# Patient Record
Sex: MALE | Race: WHITE | NOT HISPANIC OR LATINO | URBAN - METROPOLITAN AREA
[De-identification: names, ages, dates, MRNs, and addresses within clinical notes are randomized per-mention and may not be internally consistent; named-entity substitution may affect disease eponyms.]

---

## 2021-01-28 ENCOUNTER — IMMUNIZATIONS (OUTPATIENT)
Dept: FAMILY MEDICINE CLINIC | Facility: HOSPITAL | Age: 81
End: 2021-01-28

## 2021-01-28 DIAGNOSIS — Z23 ENCOUNTER FOR IMMUNIZATION: Primary | ICD-10-CM

## 2021-01-28 PROCEDURE — 0001A SARS-COV-2 / COVID-19 MRNA VACCINE (PFIZER-BIONTECH) 30 MCG: CPT

## 2021-01-28 PROCEDURE — 91300 SARS-COV-2 / COVID-19 MRNA VACCINE (PFIZER-BIONTECH) 30 MCG: CPT

## 2021-02-22 ENCOUNTER — IMMUNIZATIONS (OUTPATIENT)
Dept: FAMILY MEDICINE CLINIC | Facility: HOSPITAL | Age: 81
End: 2021-02-22

## 2021-02-22 DIAGNOSIS — Z23 ENCOUNTER FOR IMMUNIZATION: Primary | ICD-10-CM

## 2021-02-22 PROCEDURE — 0002A SARS-COV-2 / COVID-19 MRNA VACCINE (PFIZER-BIONTECH) 30 MCG: CPT

## 2021-02-22 PROCEDURE — 91300 SARS-COV-2 / COVID-19 MRNA VACCINE (PFIZER-BIONTECH) 30 MCG: CPT

## 2021-10-02 ENCOUNTER — IMMUNIZATIONS (OUTPATIENT)
Dept: FAMILY MEDICINE CLINIC | Facility: HOSPITAL | Age: 81
End: 2021-10-02

## 2021-10-02 DIAGNOSIS — Z23 ENCOUNTER FOR IMMUNIZATION: Primary | ICD-10-CM

## 2021-10-02 PROCEDURE — 91300 SARS-COV-2 / COVID-19 MRNA VACCINE (PFIZER-BIONTECH) 30 MCG: CPT

## 2021-10-02 PROCEDURE — 0001A SARS-COV-2 / COVID-19 MRNA VACCINE (PFIZER-BIONTECH) 30 MCG: CPT

## 2024-03-08 ENCOUNTER — HOSPITAL ENCOUNTER (INPATIENT)
Dept: HOSPITAL 99 - 2 SOUTH | Age: 84
LOS: 7 days | Discharge: NURSING, REHAB OR LONG-TERM CARE | DRG: 522 | End: 2024-03-15
Payer: COMMERCIAL

## 2024-03-08 VITALS — SYSTOLIC BLOOD PRESSURE: 121 MMHG | RESPIRATION RATE: 16 BRPM | DIASTOLIC BLOOD PRESSURE: 71 MMHG

## 2024-03-08 VITALS — RESPIRATION RATE: 18 BRPM | DIASTOLIC BLOOD PRESSURE: 78 MMHG | SYSTOLIC BLOOD PRESSURE: 134 MMHG

## 2024-03-08 VITALS — BODY MASS INDEX: 22.8 KG/M2

## 2024-03-08 VITALS — SYSTOLIC BLOOD PRESSURE: 147 MMHG | DIASTOLIC BLOOD PRESSURE: 83 MMHG | RESPIRATION RATE: 17 BRPM

## 2024-03-08 VITALS — SYSTOLIC BLOOD PRESSURE: 138 MMHG | DIASTOLIC BLOOD PRESSURE: 82 MMHG | RESPIRATION RATE: 16 BRPM

## 2024-03-08 DIAGNOSIS — S62.521A: ICD-10-CM

## 2024-03-08 DIAGNOSIS — Z11.52: ICD-10-CM

## 2024-03-08 DIAGNOSIS — K21.9: ICD-10-CM

## 2024-03-08 DIAGNOSIS — D62: ICD-10-CM

## 2024-03-08 DIAGNOSIS — E78.00: ICD-10-CM

## 2024-03-08 DIAGNOSIS — D64.9: ICD-10-CM

## 2024-03-08 DIAGNOSIS — Z86.73: ICD-10-CM

## 2024-03-08 DIAGNOSIS — Z87.891: ICD-10-CM

## 2024-03-08 DIAGNOSIS — S72.011A: Primary | ICD-10-CM

## 2024-03-08 DIAGNOSIS — E87.1: ICD-10-CM

## 2024-03-08 DIAGNOSIS — E03.9: ICD-10-CM

## 2024-03-08 DIAGNOSIS — N40.1: ICD-10-CM

## 2024-03-08 DIAGNOSIS — Z79.82: ICD-10-CM

## 2024-03-08 LAB
APTT PPP: 28.2 SEC (ref 23.4–35)
BUN SERPL-MCNC: 12 MG/DL (ref 9–20)
CALCIUM SERPL-MCNC: 9.6 MG/DL (ref 8.4–10.2)
CHLORIDE SERPL-SCNC: 106 MMOL/L (ref 98–107)
CO2 SERPL-SCNC: 26 MMOL/L (ref 22–30)
EGFR: > 60
ERYTHROCYTE [DISTWIDTH] IN BLOOD BY AUTOMATED COUNT: 13.5 % (ref 11.5–14.5)
ESTIMATED CREATININE CLEARANCE: 75 ML/MIN
GLUCOSE SERPL-MCNC: 122 MG/DL (ref 70–99)
HCT VFR BLD AUTO: 37.6 % (ref 39–52)
HGB BLD-MCNC: 13.2 G/DL (ref 13–18)
INR PPP: 1.05
MCHC RBC AUTO-ENTMCNC: 35.1 G/DL (ref 33–37)
MCV RBC AUTO: 87.9 FL (ref 80–94)
NRBC BLD AUTO-RTO: 0 %
PLATELET # BLD AUTO: 159 10^3/UL (ref 130–400)
POTASSIUM SERPL-SCNC: 3.7 MMOL/L (ref 3.5–5.1)
PROTHROMBIN TIME: 13.6 SEC (ref 11.4–14.6)
SODIUM SERPL-SCNC: 135 MMOL/L (ref 135–145)

## 2024-03-08 PROCEDURE — C1776 JOINT DEVICE (IMPLANTABLE): HCPCS

## 2024-03-08 PROCEDURE — C1713 ANCHOR/SCREW BN/BN,TIS/BN: HCPCS

## 2024-03-08 RX ADMIN — ATORVASTATIN CALCIUM 40 MG: 40 TABLET, FILM COATED ORAL at 18:33

## 2024-03-08 RX ADMIN — FINASTERIDE 5 MG: 5 TABLET, FILM COATED ORAL at 18:32

## 2024-03-08 RX ADMIN — SODIUM CHLORIDE 1000: 900 INJECTION, SOLUTION INTRAVENOUS at 18:29

## 2024-03-08 RX ADMIN — DOCUSATE SODIUM: 100 CAPSULE, LIQUID FILLED ORAL at 21:32

## 2024-03-08 RX ADMIN — Medication 1 CAP: at 21:32

## 2024-03-08 RX ADMIN — CALCIUM CARBONATE 2 TABLET: 500 TABLET, CHEWABLE ORAL at 22:00

## 2024-03-08 RX ADMIN — ENOXAPARIN SODIUM 40 MG: 40 INJECTION SUBCUTANEOUS at 18:31

## 2024-03-08 RX ADMIN — MORPHINE SULFATE 3 MG: 4 INJECTION INTRAVENOUS at 18:41

## 2024-03-08 RX ADMIN — DOCUSATE SODIUM: 100 CAPSULE, LIQUID FILLED ORAL at 22:03

## 2024-03-08 RX ADMIN — MORPHINE SULFATE 4 MG: 4 INJECTION INTRAVENOUS at 14:00

## 2024-03-09 VITALS — SYSTOLIC BLOOD PRESSURE: 132 MMHG | RESPIRATION RATE: 18 BRPM | DIASTOLIC BLOOD PRESSURE: 80 MMHG

## 2024-03-09 VITALS — RESPIRATION RATE: 16 BRPM | DIASTOLIC BLOOD PRESSURE: 87 MMHG | SYSTOLIC BLOOD PRESSURE: 138 MMHG

## 2024-03-09 VITALS — DIASTOLIC BLOOD PRESSURE: 86 MMHG | SYSTOLIC BLOOD PRESSURE: 140 MMHG | RESPIRATION RATE: 16 BRPM

## 2024-03-09 LAB
BUN SERPL-MCNC: 14 MG/DL (ref 9–20)
CALCIUM SERPL-MCNC: 9.2 MG/DL (ref 8.4–10.2)
CHLORIDE SERPL-SCNC: 104 MMOL/L (ref 98–107)
CO2 SERPL-SCNC: 24 MMOL/L (ref 22–30)
EGFR: > 60
ERYTHROCYTE [DISTWIDTH] IN BLOOD BY AUTOMATED COUNT: 13.3 % (ref 11.5–14.5)
ESTIMATED CREATININE CLEARANCE: 85 ML/MIN
GLUCOSE SERPL-MCNC: 97 MG/DL (ref 70–99)
HCT VFR BLD AUTO: 35.6 % (ref 39–52)
HGB BLD-MCNC: 12.7 G/DL (ref 13–18)
MCHC RBC AUTO-ENTMCNC: 35.7 G/DL (ref 33–37)
MCV RBC AUTO: 87.3 FL (ref 80–94)
PLATELET # BLD AUTO: 155 10^3/UL (ref 130–400)
POTASSIUM SERPL-SCNC: 3.8 MMOL/L (ref 3.5–5.1)
SODIUM SERPL-SCNC: 134 MMOL/L (ref 135–145)
TSH SERPL DL<=0.05 MIU/L-ACNC: 4.03 UIU/ML (ref 0.47–4.68)

## 2024-03-09 RX ADMIN — ACETAMINOPHEN 650 MG: 325 TABLET ORAL at 00:24

## 2024-03-09 RX ADMIN — Medication 25 MCG: at 08:37

## 2024-03-09 RX ADMIN — PANTOPRAZOLE SODIUM 40 MG: 40 TABLET, DELAYED RELEASE ORAL at 08:37

## 2024-03-09 RX ADMIN — ENOXAPARIN SODIUM 40 MG: 40 INJECTION SUBCUTANEOUS at 17:02

## 2024-03-09 RX ADMIN — MORPHINE SULFATE 3 MG: 4 INJECTION INTRAVENOUS at 12:16

## 2024-03-09 RX ADMIN — ASPIRIN 81 MG: 81 TABLET, CHEWABLE ORAL at 08:37

## 2024-03-09 RX ADMIN — ACETAMINOPHEN 650 MG: 325 TABLET ORAL at 20:36

## 2024-03-09 RX ADMIN — Medication 1 CAP: at 20:37

## 2024-03-09 RX ADMIN — SENNOSIDES 8.6 MG: 8.6 TABLET ORAL at 12:17

## 2024-03-09 RX ADMIN — LIDOCAINE 1 PATCH: 4 PATCH TOPICAL at 08:37

## 2024-03-09 RX ADMIN — Medication 1 CAP: at 08:37

## 2024-03-09 RX ADMIN — MORPHINE SULFATE 3 MG: 4 INJECTION INTRAVENOUS at 03:40

## 2024-03-09 RX ADMIN — DOCUSATE SODIUM 100 MG: 100 CAPSULE, LIQUID FILLED ORAL at 20:37

## 2024-03-09 RX ADMIN — ACETAMINOPHEN 650 MG: 325 TABLET ORAL at 07:14

## 2024-03-09 RX ADMIN — ACETAMINOPHEN 650 MG: 325 TABLET ORAL at 15:35

## 2024-03-09 RX ADMIN — DOCUSATE SODIUM 100 MG: 100 CAPSULE, LIQUID FILLED ORAL at 08:37

## 2024-03-09 RX ADMIN — LEVOTHYROXINE SODIUM 88 MCG: 0.09 TABLET ORAL at 07:14

## 2024-03-09 RX ADMIN — ATORVASTATIN CALCIUM 40 MG: 40 TABLET, FILM COATED ORAL at 17:02

## 2024-03-09 RX ADMIN — FINASTERIDE 5 MG: 5 TABLET, FILM COATED ORAL at 17:08

## 2024-03-10 VITALS — DIASTOLIC BLOOD PRESSURE: 74 MMHG | SYSTOLIC BLOOD PRESSURE: 126 MMHG | RESPIRATION RATE: 14 BRPM

## 2024-03-10 VITALS — DIASTOLIC BLOOD PRESSURE: 65 MMHG | SYSTOLIC BLOOD PRESSURE: 104 MMHG | RESPIRATION RATE: 18 BRPM

## 2024-03-10 VITALS — DIASTOLIC BLOOD PRESSURE: 72 MMHG | RESPIRATION RATE: 16 BRPM | SYSTOLIC BLOOD PRESSURE: 120 MMHG

## 2024-03-10 VITALS
SYSTOLIC BLOOD PRESSURE: 138 MMHG | RESPIRATION RATE: 15 BRPM | DIASTOLIC BLOOD PRESSURE: 62 MMHG | OXYGEN SATURATION: 2 %

## 2024-03-10 VITALS
RESPIRATION RATE: 13 BRPM | OXYGEN SATURATION: 2 % | DIASTOLIC BLOOD PRESSURE: 69 MMHG | SYSTOLIC BLOOD PRESSURE: 111 MMHG

## 2024-03-10 VITALS — DIASTOLIC BLOOD PRESSURE: 96 MMHG | SYSTOLIC BLOOD PRESSURE: 139 MMHG | RESPIRATION RATE: 19 BRPM

## 2024-03-10 VITALS — OXYGEN SATURATION: 2 %

## 2024-03-10 VITALS — RESPIRATION RATE: 16 BRPM | SYSTOLIC BLOOD PRESSURE: 96 MMHG | DIASTOLIC BLOOD PRESSURE: 55 MMHG

## 2024-03-10 VITALS — DIASTOLIC BLOOD PRESSURE: 87 MMHG | SYSTOLIC BLOOD PRESSURE: 165 MMHG | RESPIRATION RATE: 18 BRPM

## 2024-03-10 VITALS — RESPIRATION RATE: 19 BRPM | SYSTOLIC BLOOD PRESSURE: 139 MMHG | DIASTOLIC BLOOD PRESSURE: 96 MMHG

## 2024-03-10 VITALS — DIASTOLIC BLOOD PRESSURE: 20 MMHG | RESPIRATION RATE: 18 BRPM | SYSTOLIC BLOOD PRESSURE: 143 MMHG

## 2024-03-10 VITALS — DIASTOLIC BLOOD PRESSURE: 72 MMHG | RESPIRATION RATE: 12 BRPM | SYSTOLIC BLOOD PRESSURE: 120 MMHG

## 2024-03-10 LAB
ERYTHROCYTE [DISTWIDTH] IN BLOOD BY AUTOMATED COUNT: 13.4 % (ref 11.5–14.5)
HCT VFR BLD AUTO: 36.6 % (ref 39–52)
HGB BLD-MCNC: 12.7 G/DL (ref 13–18)
MCHC RBC AUTO-ENTMCNC: 34.7 G/DL (ref 33–37)
MCV RBC AUTO: 90.4 FL (ref 80–94)
PLATELET # BLD AUTO: 144 10^3/UL (ref 130–400)

## 2024-03-10 PROCEDURE — 0SRR0J9 REPLACEMENT OF RIGHT HIP JOINT, FEMORAL SURFACE WITH SYNTHETIC SUBSTITUTE, CEMENTED, OPEN APPROACH: ICD-10-PCS | Performed by: ORTHOPAEDIC SURGERY

## 2024-03-10 RX ADMIN — CEFAZOLIN 5: 10 INJECTION, POWDER, FOR SOLUTION INTRAVENOUS at 16:25

## 2024-03-10 RX ADMIN — DOCUSATE SODIUM 100 MG: 100 CAPSULE, LIQUID FILLED ORAL at 20:55

## 2024-03-10 RX ADMIN — DOCUSATE SODIUM: 100 CAPSULE, LIQUID FILLED ORAL at 14:46

## 2024-03-10 RX ADMIN — ASPIRIN 81 MG: 81 TABLET, CHEWABLE ORAL at 16:24

## 2024-03-10 RX ADMIN — LIDOCAINE: 4 PATCH TOPICAL at 14:47

## 2024-03-10 RX ADMIN — SENNOSIDES: 8.6 TABLET ORAL at 16:25

## 2024-03-10 RX ADMIN — PANTOPRAZOLE SODIUM 40 MG: 40 TABLET, DELAYED RELEASE ORAL at 16:24

## 2024-03-10 RX ADMIN — ENOXAPARIN SODIUM 40 MG: 40 INJECTION SUBCUTANEOUS at 16:24

## 2024-03-10 RX ADMIN — Medication 25 MCG: at 16:24

## 2024-03-10 RX ADMIN — ATORVASTATIN CALCIUM 40 MG: 40 TABLET, FILM COATED ORAL at 16:24

## 2024-03-10 RX ADMIN — Medication 1 CAP: at 21:33

## 2024-03-10 RX ADMIN — LEVOTHYROXINE SODIUM 88 MCG: 0.09 TABLET ORAL at 06:23

## 2024-03-10 RX ADMIN — Medication: at 14:47

## 2024-03-10 RX ADMIN — MUPIROCIN 1 APPLIC: 20 OINTMENT TOPICAL at 20:56

## 2024-03-10 RX ADMIN — FINASTERIDE 5 MG: 5 TABLET, FILM COATED ORAL at 16:28

## 2024-03-11 VITALS — DIASTOLIC BLOOD PRESSURE: 62 MMHG | RESPIRATION RATE: 19 BRPM | SYSTOLIC BLOOD PRESSURE: 110 MMHG

## 2024-03-11 VITALS — DIASTOLIC BLOOD PRESSURE: 82 MMHG | HEART RATE: 106 BPM | SYSTOLIC BLOOD PRESSURE: 118 MMHG | RESPIRATION RATE: 19 BRPM

## 2024-03-11 VITALS — DIASTOLIC BLOOD PRESSURE: 63 MMHG | SYSTOLIC BLOOD PRESSURE: 105 MMHG | RESPIRATION RATE: 16 BRPM

## 2024-03-11 LAB
BUN SERPL-MCNC: 17 MG/DL (ref 9–20)
CALCIUM SERPL-MCNC: 9 MG/DL (ref 8.4–10.2)
CHLORIDE SERPL-SCNC: 100 MMOL/L (ref 98–107)
CO2 SERPL-SCNC: 29 MMOL/L (ref 22–30)
EGFR: > 60
ERYTHROCYTE [DISTWIDTH] IN BLOOD BY AUTOMATED COUNT: 13.3 % (ref 11.5–14.5)
ESTIMATED CREATININE CLEARANCE: 66 ML/MIN
GLUCOSE SERPL-MCNC: 135 MG/DL (ref 70–99)
HCT VFR BLD AUTO: 33.6 % (ref 39–52)
HCT VFR BLD AUTO: 34.2 % (ref 39–52)
HGB BLD-MCNC: 11.8 G/DL (ref 13–18)
HGB BLD-MCNC: 11.9 G/DL (ref 13–18)
MCHC RBC AUTO-ENTMCNC: 35.1 G/DL (ref 33–37)
MCV RBC AUTO: 88.4 FL (ref 80–94)
PLATELET # BLD AUTO: 139 10^3/UL (ref 130–400)
POTASSIUM SERPL-SCNC: 4.2 MMOL/L (ref 3.5–5.1)
SODIUM SERPL-SCNC: 135 MMOL/L (ref 135–145)

## 2024-03-11 RX ADMIN — OXYCODONE HYDROCHLORIDE 5 MG: 5 TABLET ORAL at 09:21

## 2024-03-11 RX ADMIN — LIDOCAINE 1 PATCH: 4 PATCH TOPICAL at 09:20

## 2024-03-11 RX ADMIN — SENNOSIDES 8.6 MG: 8.6 TABLET ORAL at 12:20

## 2024-03-11 RX ADMIN — ENOXAPARIN SODIUM 40 MG: 40 INJECTION SUBCUTANEOUS at 17:46

## 2024-03-11 RX ADMIN — MUPIROCIN 1 APPLIC: 20 OINTMENT TOPICAL at 09:19

## 2024-03-11 RX ADMIN — Medication 1 CAP: at 09:21

## 2024-03-11 RX ADMIN — CEFAZOLIN 5: 10 INJECTION, POWDER, FOR SOLUTION INTRAVENOUS at 00:23

## 2024-03-11 RX ADMIN — PANTOPRAZOLE SODIUM 40 MG: 40 TABLET, DELAYED RELEASE ORAL at 09:21

## 2024-03-11 RX ADMIN — ACETAMINOPHEN 650 MG: 325 TABLET ORAL at 05:48

## 2024-03-11 RX ADMIN — DOCUSATE SODIUM 100 MG: 100 CAPSULE, LIQUID FILLED ORAL at 09:20

## 2024-03-11 RX ADMIN — HYDROMORPHONE HYDROCHLORIDE 0.5 MG: 1 INJECTION, SOLUTION INTRAMUSCULAR; INTRAVENOUS; SUBCUTANEOUS at 02:54

## 2024-03-11 RX ADMIN — LEVOTHYROXINE SODIUM 88 MCG: 0.09 TABLET ORAL at 05:50

## 2024-03-11 RX ADMIN — Medication 1 CAP: at 21:28

## 2024-03-11 RX ADMIN — MUPIROCIN 1 APPLIC: 20 OINTMENT TOPICAL at 21:29

## 2024-03-11 RX ADMIN — ATORVASTATIN CALCIUM 40 MG: 40 TABLET, FILM COATED ORAL at 17:46

## 2024-03-11 RX ADMIN — Medication 25 MCG: at 09:21

## 2024-03-11 RX ADMIN — ASPIRIN 81 MG: 81 TABLET, CHEWABLE ORAL at 09:21

## 2024-03-11 RX ADMIN — FINASTERIDE 5 MG: 5 TABLET, FILM COATED ORAL at 17:46

## 2024-03-11 RX ADMIN — OXYCODONE HYDROCHLORIDE 5 MG: 5 TABLET ORAL at 14:33

## 2024-03-11 RX ADMIN — DOCUSATE SODIUM 100 MG: 100 CAPSULE, LIQUID FILLED ORAL at 21:28

## 2024-03-11 RX ADMIN — ACETAMINOPHEN 650 MG: 325 TABLET ORAL at 21:36

## 2024-03-12 VITALS — RESPIRATION RATE: 18 BRPM | DIASTOLIC BLOOD PRESSURE: 74 MMHG | SYSTOLIC BLOOD PRESSURE: 116 MMHG

## 2024-03-12 VITALS — SYSTOLIC BLOOD PRESSURE: 131 MMHG | HEART RATE: 97 BPM | OXYGEN SATURATION: 99 % | DIASTOLIC BLOOD PRESSURE: 80 MMHG

## 2024-03-12 VITALS — RESPIRATION RATE: 16 BRPM | SYSTOLIC BLOOD PRESSURE: 114 MMHG | DIASTOLIC BLOOD PRESSURE: 72 MMHG

## 2024-03-12 VITALS — SYSTOLIC BLOOD PRESSURE: 131 MMHG | HEART RATE: 97 BPM | DIASTOLIC BLOOD PRESSURE: 80 MMHG | OXYGEN SATURATION: 98 %

## 2024-03-12 VITALS — RESPIRATION RATE: 16 BRPM | SYSTOLIC BLOOD PRESSURE: 131 MMHG | DIASTOLIC BLOOD PRESSURE: 70 MMHG

## 2024-03-12 LAB
ERYTHROCYTE [DISTWIDTH] IN BLOOD BY AUTOMATED COUNT: 13.4 % (ref 11.5–14.5)
HCT VFR BLD AUTO: 31.7 % (ref 39–52)
HGB BLD-MCNC: 11.1 G/DL (ref 13–18)
MCHC RBC AUTO-ENTMCNC: 35 G/DL (ref 33–37)
MCV RBC AUTO: 89.3 FL (ref 80–94)
PLATELET # BLD AUTO: 133 10^3/UL (ref 130–400)

## 2024-03-12 RX ADMIN — LIDOCAINE 1 PATCH: 4 PATCH TOPICAL at 08:35

## 2024-03-12 RX ADMIN — ENOXAPARIN SODIUM 40 MG: 40 INJECTION SUBCUTANEOUS at 18:03

## 2024-03-12 RX ADMIN — DOXYCYCLINE HYCLATE 100 MG: 100 CAPSULE ORAL at 19:40

## 2024-03-12 RX ADMIN — DOCUSATE SODIUM 100 MG: 100 CAPSULE, LIQUID FILLED ORAL at 08:35

## 2024-03-12 RX ADMIN — FINASTERIDE 5 MG: 5 TABLET, FILM COATED ORAL at 18:03

## 2024-03-12 RX ADMIN — Medication 1 CAP: at 08:35

## 2024-03-12 RX ADMIN — ATORVASTATIN CALCIUM 40 MG: 40 TABLET, FILM COATED ORAL at 18:03

## 2024-03-12 RX ADMIN — MAGNESIUM HYDROXIDE 30 ML: 1200 LIQUID ORAL at 18:04

## 2024-03-12 RX ADMIN — OXYCODONE HYDROCHLORIDE 5 MG: 5 TABLET ORAL at 04:24

## 2024-03-12 RX ADMIN — DOCUSATE SODIUM 100 MG: 100 CAPSULE, LIQUID FILLED ORAL at 19:40

## 2024-03-12 RX ADMIN — DOXYCYCLINE HYCLATE 100 MG: 100 CAPSULE ORAL at 13:50

## 2024-03-12 RX ADMIN — ASPIRIN 81 MG: 81 TABLET, CHEWABLE ORAL at 08:35

## 2024-03-12 RX ADMIN — Medication 25 MCG: at 08:35

## 2024-03-12 RX ADMIN — OXYCODONE HYDROCHLORIDE 5 MG: 5 TABLET ORAL at 08:55

## 2024-03-12 RX ADMIN — LEVOTHYROXINE SODIUM 88 MCG: 0.09 TABLET ORAL at 08:40

## 2024-03-12 RX ADMIN — Medication 1 CAP: at 19:40

## 2024-03-12 RX ADMIN — SENNOSIDES 8.6 MG: 8.6 TABLET ORAL at 11:47

## 2024-03-12 RX ADMIN — PANTOPRAZOLE SODIUM 40 MG: 40 TABLET, DELAYED RELEASE ORAL at 08:35

## 2024-03-13 VITALS — SYSTOLIC BLOOD PRESSURE: 135 MMHG | DIASTOLIC BLOOD PRESSURE: 78 MMHG | RESPIRATION RATE: 18 BRPM

## 2024-03-13 VITALS — RESPIRATION RATE: 18 BRPM | SYSTOLIC BLOOD PRESSURE: 138 MMHG | DIASTOLIC BLOOD PRESSURE: 69 MMHG

## 2024-03-13 VITALS — DIASTOLIC BLOOD PRESSURE: 77 MMHG | SYSTOLIC BLOOD PRESSURE: 126 MMHG | RESPIRATION RATE: 16 BRPM

## 2024-03-13 LAB
BUN SERPL-MCNC: 17 MG/DL (ref 9–20)
CALCIUM SERPL-MCNC: 8.7 MG/DL (ref 8.4–10.2)
CHLORIDE SERPL-SCNC: 98 MMOL/L (ref 98–107)
CO2 SERPL-SCNC: 25 MMOL/L (ref 22–30)
EGFR: > 60
ERYTHROCYTE [DISTWIDTH] IN BLOOD BY AUTOMATED COUNT: 13.2 % (ref 11.5–14.5)
ESTIMATED CREATININE CLEARANCE: 99 ML/MIN
GLUCOSE SERPL-MCNC: 119 MG/DL (ref 70–99)
HCT VFR BLD AUTO: 29.8 % (ref 39–52)
HGB BLD-MCNC: 10.5 G/DL (ref 13–18)
MCHC RBC AUTO-ENTMCNC: 35.2 G/DL (ref 33–37)
MCV RBC AUTO: 88.7 FL (ref 80–94)
PLATELET # BLD AUTO: 152 10^3/UL (ref 130–400)
POTASSIUM SERPL-SCNC: 3.9 MMOL/L (ref 3.5–5.1)
SODIUM SERPL-SCNC: 130 MMOL/L (ref 135–145)

## 2024-03-13 RX ADMIN — ENOXAPARIN SODIUM 40 MG: 40 INJECTION SUBCUTANEOUS at 17:37

## 2024-03-13 RX ADMIN — MAGNESIUM HYDROXIDE 30 ML: 1200 LIQUID ORAL at 08:31

## 2024-03-13 RX ADMIN — LIDOCAINE 1 PATCH: 4 PATCH TOPICAL at 08:31

## 2024-03-13 RX ADMIN — HYDROMORPHONE HYDROCHLORIDE 0.5 MG: 1 INJECTION, SOLUTION INTRAMUSCULAR; INTRAVENOUS; SUBCUTANEOUS at 23:21

## 2024-03-13 RX ADMIN — ASPIRIN 81 MG: 81 TABLET, CHEWABLE ORAL at 08:31

## 2024-03-13 RX ADMIN — FINASTERIDE 5 MG: 5 TABLET, FILM COATED ORAL at 17:37

## 2024-03-13 RX ADMIN — Medication 25 MCG: at 08:31

## 2024-03-13 RX ADMIN — DOCUSATE SODIUM 100 MG: 100 CAPSULE, LIQUID FILLED ORAL at 08:31

## 2024-03-13 RX ADMIN — OXYCODONE HYDROCHLORIDE 10 MG: 10 TABLET ORAL at 22:03

## 2024-03-13 RX ADMIN — SENNOSIDES 8.6 MG: 8.6 TABLET ORAL at 13:13

## 2024-03-13 RX ADMIN — Medication 1 CAP: at 08:31

## 2024-03-13 RX ADMIN — ATORVASTATIN CALCIUM 40 MG: 40 TABLET, FILM COATED ORAL at 17:37

## 2024-03-13 RX ADMIN — Medication 1 CAP: at 20:41

## 2024-03-13 RX ADMIN — PANTOPRAZOLE SODIUM 40 MG: 40 TABLET, DELAYED RELEASE ORAL at 08:31

## 2024-03-13 RX ADMIN — DOCUSATE SODIUM 100 MG: 100 CAPSULE, LIQUID FILLED ORAL at 20:41

## 2024-03-13 RX ADMIN — OXYCODONE HYDROCHLORIDE 5 MG: 5 TABLET ORAL at 20:45

## 2024-03-13 RX ADMIN — ACETAMINOPHEN 650 MG: 325 TABLET ORAL at 20:44

## 2024-03-13 RX ADMIN — LEVOTHYROXINE SODIUM 88 MCG: 0.09 TABLET ORAL at 05:55

## 2024-03-13 RX ADMIN — DOXYCYCLINE HYCLATE 100 MG: 100 CAPSULE ORAL at 20:41

## 2024-03-13 RX ADMIN — DOXYCYCLINE HYCLATE 100 MG: 100 CAPSULE ORAL at 08:31

## 2024-03-13 RX ADMIN — OXYCODONE HYDROCHLORIDE 2.5 MG: 5 TABLET ORAL at 04:30

## 2024-03-14 VITALS — SYSTOLIC BLOOD PRESSURE: 126 MMHG | DIASTOLIC BLOOD PRESSURE: 75 MMHG | RESPIRATION RATE: 18 BRPM

## 2024-03-14 VITALS — DIASTOLIC BLOOD PRESSURE: 69 MMHG | SYSTOLIC BLOOD PRESSURE: 121 MMHG | RESPIRATION RATE: 18 BRPM

## 2024-03-14 VITALS — RESPIRATION RATE: 20 BRPM | DIASTOLIC BLOOD PRESSURE: 76 MMHG | SYSTOLIC BLOOD PRESSURE: 117 MMHG

## 2024-03-14 LAB
BUN SERPL-MCNC: 18 MG/DL (ref 9–20)
CALCIUM SERPL-MCNC: 8.9 MG/DL (ref 8.4–10.2)
CHLORIDE SERPL-SCNC: 96 MMOL/L (ref 98–107)
CO2 SERPL-SCNC: 31 MMOL/L (ref 22–30)
EGFR: > 60
ERYTHROCYTE [DISTWIDTH] IN BLOOD BY AUTOMATED COUNT: 13.5 % (ref 11.5–14.5)
ESTIMATED CREATININE CLEARANCE: 85 ML/MIN
GLUCOSE SERPL-MCNC: 121 MG/DL (ref 70–99)
HCT VFR BLD AUTO: 30.1 % (ref 39–52)
HGB BLD-MCNC: 10.6 G/DL (ref 13–18)
MCHC RBC AUTO-ENTMCNC: 35.2 G/DL (ref 33–37)
MCV RBC AUTO: 88.3 FL (ref 80–94)
PLATELET # BLD AUTO: 191 10^3/UL (ref 130–400)
POTASSIUM SERPL-SCNC: 3.8 MMOL/L (ref 3.5–5.1)
SODIUM SERPL-SCNC: 130 MMOL/L (ref 135–145)

## 2024-03-14 RX ADMIN — Medication 25 MCG: at 08:49

## 2024-03-14 RX ADMIN — ATORVASTATIN CALCIUM 40 MG: 40 TABLET, FILM COATED ORAL at 17:19

## 2024-03-14 RX ADMIN — ACETAMINOPHEN 650 MG: 325 TABLET ORAL at 05:21

## 2024-03-14 RX ADMIN — DOCUSATE SODIUM 100 MG: 100 CAPSULE, LIQUID FILLED ORAL at 08:48

## 2024-03-14 RX ADMIN — Medication 2 FLUSH: at 06:13

## 2024-03-14 RX ADMIN — DOXYCYCLINE HYCLATE 100 MG: 100 CAPSULE ORAL at 08:48

## 2024-03-14 RX ADMIN — LEVOTHYROXINE SODIUM 88 MCG: 0.09 TABLET ORAL at 05:21

## 2024-03-14 RX ADMIN — LIDOCAINE 1 PATCH: 4 PATCH TOPICAL at 08:49

## 2024-03-14 RX ADMIN — OXYCODONE HYDROCHLORIDE 10 MG: 10 TABLET ORAL at 17:26

## 2024-03-14 RX ADMIN — DOXYCYCLINE HYCLATE 100 MG: 100 CAPSULE ORAL at 20:32

## 2024-03-14 RX ADMIN — HYDROMORPHONE HYDROCHLORIDE 0.5 MG: 1 INJECTION, SOLUTION INTRAMUSCULAR; INTRAVENOUS; SUBCUTANEOUS at 06:12

## 2024-03-14 RX ADMIN — ENOXAPARIN SODIUM 40 MG: 40 INJECTION SUBCUTANEOUS at 17:20

## 2024-03-14 RX ADMIN — DOCUSATE SODIUM 100 MG: 100 CAPSULE, LIQUID FILLED ORAL at 20:32

## 2024-03-14 RX ADMIN — ASPIRIN 81 MG: 81 TABLET, CHEWABLE ORAL at 08:48

## 2024-03-14 RX ADMIN — FINASTERIDE 5 MG: 5 TABLET, FILM COATED ORAL at 17:19

## 2024-03-14 RX ADMIN — Medication 1 CAP: at 20:32

## 2024-03-14 RX ADMIN — PANTOPRAZOLE SODIUM 40 MG: 40 TABLET, DELAYED RELEASE ORAL at 08:48

## 2024-03-14 RX ADMIN — OXYCODONE HYDROCHLORIDE 5 MG: 5 TABLET ORAL at 05:21

## 2024-03-14 RX ADMIN — Medication 1 CAP: at 08:48

## 2024-03-14 RX ADMIN — SENNOSIDES 8.6 MG: 8.6 TABLET ORAL at 13:06

## 2024-03-15 VITALS — DIASTOLIC BLOOD PRESSURE: 65 MMHG | RESPIRATION RATE: 18 BRPM | SYSTOLIC BLOOD PRESSURE: 108 MMHG

## 2024-03-15 LAB
BUN SERPL-MCNC: 18 MG/DL (ref 9–20)
CALCIUM SERPL-MCNC: 8.8 MG/DL (ref 8.4–10.2)
CHLORIDE SERPL-SCNC: 101 MMOL/L (ref 98–107)
CO2 SERPL-SCNC: 24 MMOL/L (ref 22–30)
EGFR: > 60
ERYTHROCYTE [DISTWIDTH] IN BLOOD BY AUTOMATED COUNT: 13.4 % (ref 11.5–14.5)
ESTIMATED CREATININE CLEARANCE: 85 ML/MIN
GLUCOSE SERPL-MCNC: 119 MG/DL (ref 70–99)
HCT VFR BLD AUTO: 29.8 % (ref 39–52)
HGB BLD-MCNC: 10.6 G/DL (ref 13–18)
MCHC RBC AUTO-ENTMCNC: 35.6 G/DL (ref 33–37)
MCV RBC AUTO: 86.9 FL (ref 80–94)
PLATELET # BLD AUTO: 228 10^3/UL (ref 130–400)
POTASSIUM SERPL-SCNC: 4.1 MMOL/L (ref 3.5–5.1)
SODIUM SERPL-SCNC: 130 MMOL/L (ref 135–145)

## 2024-03-15 RX ADMIN — MAGNESIUM HYDROXIDE 30 ML: 1200 LIQUID ORAL at 13:08

## 2024-03-15 RX ADMIN — OXYCODONE HYDROCHLORIDE 5 MG: 5 TABLET ORAL at 00:06

## 2024-03-15 RX ADMIN — LIDOCAINE 1 PATCH: 4 PATCH TOPICAL at 08:24

## 2024-03-15 RX ADMIN — Medication 1 CAP: at 08:24

## 2024-03-15 RX ADMIN — Medication 25 MCG: at 08:25

## 2024-03-15 RX ADMIN — OXYCODONE HYDROCHLORIDE 10 MG: 10 TABLET ORAL at 08:28

## 2024-03-15 RX ADMIN — PANTOPRAZOLE SODIUM 40 MG: 40 TABLET, DELAYED RELEASE ORAL at 08:24

## 2024-03-15 RX ADMIN — DOCUSATE SODIUM 100 MG: 100 CAPSULE, LIQUID FILLED ORAL at 08:24

## 2024-03-15 RX ADMIN — LEVOTHYROXINE SODIUM 88 MCG: 0.09 TABLET ORAL at 06:07

## 2024-03-15 RX ADMIN — DOXYCYCLINE HYCLATE 100 MG: 100 CAPSULE ORAL at 08:24

## 2024-03-15 RX ADMIN — SENNOSIDES 8.6 MG: 8.6 TABLET ORAL at 12:20

## 2024-03-15 RX ADMIN — ASPIRIN 81 MG: 81 TABLET, CHEWABLE ORAL at 08:24

## 2024-03-18 ENCOUNTER — HOSPITAL ENCOUNTER (OUTPATIENT)
Dept: HOSPITAL 99 - OLABP | Age: 84
End: 2024-03-18
Payer: COMMERCIAL

## 2024-03-18 DIAGNOSIS — Z86.73: ICD-10-CM

## 2024-03-18 DIAGNOSIS — D64.9: ICD-10-CM

## 2024-03-18 DIAGNOSIS — S72.011D: ICD-10-CM

## 2024-03-18 DIAGNOSIS — E87.1: ICD-10-CM

## 2024-03-18 DIAGNOSIS — N40.0: ICD-10-CM

## 2024-03-18 DIAGNOSIS — S62.501A: Primary | ICD-10-CM

## 2024-03-18 LAB
BUN SERPL-MCNC: 17 MG/DL (ref 9–20)
CALCIUM SERPL-MCNC: 8.5 MG/DL (ref 8.4–10.2)
CHLORIDE SERPL-SCNC: 103 MMOL/L (ref 98–107)
CO2 SERPL-SCNC: 25 MMOL/L (ref 22–30)
EGFR: > 60
ERYTHROCYTE [DISTWIDTH] IN BLOOD BY AUTOMATED COUNT: 13.5 % (ref 11.5–14.5)
GLUCOSE SERPL-MCNC: 100 MG/DL (ref 70–99)
HCT VFR BLD AUTO: 27 % (ref 39–52)
HGB BLD-MCNC: 9.5 G/DL (ref 13–18)
MCHC RBC AUTO-ENTMCNC: 35.2 G/DL (ref 33–37)
MCV RBC AUTO: 87.7 FL (ref 80–94)
PLATELET # BLD AUTO: 292 10^3/UL (ref 130–400)
POTASSIUM SERPL-SCNC: 4 MMOL/L (ref 3.5–5.1)
SODIUM SERPL-SCNC: 131 MMOL/L (ref 135–145)

## 2024-03-20 ENCOUNTER — HOSPITAL ENCOUNTER (OUTPATIENT)
Dept: HOSPITAL 99 - OLABP | Age: 84
End: 2024-03-20
Payer: COMMERCIAL

## 2024-03-20 DIAGNOSIS — N40.0: ICD-10-CM

## 2024-03-20 DIAGNOSIS — D64.9: ICD-10-CM

## 2024-03-20 DIAGNOSIS — Z86.73: ICD-10-CM

## 2024-03-20 DIAGNOSIS — S62.501D: ICD-10-CM

## 2024-03-20 DIAGNOSIS — S72.011D: Primary | ICD-10-CM

## 2024-03-20 DIAGNOSIS — E87.1: ICD-10-CM

## 2024-03-20 LAB
BUN SERPL-MCNC: 15 MG/DL (ref 9–20)
CALCIUM SERPL-MCNC: 8.7 MG/DL (ref 8.4–10.2)
CHLORIDE SERPL-SCNC: 104 MMOL/L (ref 98–107)
CO2 SERPL-SCNC: 28 MMOL/L (ref 22–30)
EGFR: > 60
ERYTHROCYTE [DISTWIDTH] IN BLOOD BY AUTOMATED COUNT: 13.7 % (ref 11.5–14.5)
GLUCOSE SERPL-MCNC: 91 MG/DL (ref 70–99)
HCT VFR BLD AUTO: 28.5 % (ref 39–52)
HGB BLD-MCNC: 9.7 G/DL (ref 13–18)
MCHC RBC AUTO-ENTMCNC: 34 G/DL (ref 33–37)
MCV RBC AUTO: 90.2 FL (ref 80–94)
NRBC BLD AUTO-RTO: 0 %
PLATELET # BLD AUTO: 325 10^3/UL (ref 130–400)
POTASSIUM SERPL-SCNC: 4.1 MMOL/L (ref 3.5–5.1)
SODIUM SERPL-SCNC: 133 MMOL/L (ref 135–145)

## 2024-04-09 ENCOUNTER — HOSPITAL ENCOUNTER (OUTPATIENT)
Dept: HOSPITAL 99 - OLABP | Age: 84
End: 2024-04-09
Payer: COMMERCIAL

## 2024-04-09 DIAGNOSIS — S72.011D: Primary | ICD-10-CM

## 2024-04-09 DIAGNOSIS — S62.501A: ICD-10-CM

## 2024-04-09 DIAGNOSIS — N40.0: ICD-10-CM

## 2024-04-09 DIAGNOSIS — E87.1: ICD-10-CM

## 2024-04-09 DIAGNOSIS — Z86.73: ICD-10-CM

## 2024-04-09 DIAGNOSIS — D64.9: ICD-10-CM

## 2024-04-09 LAB
BUN SERPL-MCNC: 15 MG/DL (ref 9–20)
CALCIUM SERPL-MCNC: 9.6 MG/DL (ref 8.4–10.2)
CHLORIDE SERPL-SCNC: 98 MMOL/L (ref 98–107)
CO2 SERPL-SCNC: 28 MMOL/L (ref 22–30)
EGFR: > 60
ERYTHROCYTE [DISTWIDTH] IN BLOOD BY AUTOMATED COUNT: 13.5 % (ref 11.5–14.5)
GLUCOSE SERPL-MCNC: 87 MG/DL (ref 70–99)
HCT VFR BLD AUTO: 32 % (ref 39–52)
HGB BLD-MCNC: 10.8 G/DL (ref 13–18)
MCHC RBC AUTO-ENTMCNC: 33.8 G/DL (ref 33–37)
MCV RBC AUTO: 87.4 FL (ref 80–94)
NRBC BLD AUTO-RTO: 0 %
PLATELET # BLD AUTO: 191 10^3/UL (ref 130–400)
POTASSIUM SERPL-SCNC: 4 MMOL/L (ref 3.5–5.1)
SODIUM SERPL-SCNC: 134 MMOL/L (ref 135–145)

## 2024-05-23 ENCOUNTER — HOSPITAL ENCOUNTER (INPATIENT)
Dept: HOSPITAL 99 - 4 WEST ACU | Age: 84
LOS: 6 days | Discharge: NURSING, REHAB OR LONG-TERM CARE | DRG: 565 | End: 2024-05-29
Payer: COMMERCIAL

## 2024-05-23 VITALS — SYSTOLIC BLOOD PRESSURE: 160 MMHG | RESPIRATION RATE: 16 BRPM | DIASTOLIC BLOOD PRESSURE: 95 MMHG

## 2024-05-23 VITALS — RESPIRATION RATE: 14 BRPM

## 2024-05-23 VITALS — BODY MASS INDEX: 19.7 KG/M2

## 2024-05-23 VITALS — DIASTOLIC BLOOD PRESSURE: 94 MMHG | SYSTOLIC BLOOD PRESSURE: 143 MMHG

## 2024-05-23 VITALS — RESPIRATION RATE: 13 BRPM

## 2024-05-23 VITALS — RESPIRATION RATE: 16 BRPM | DIASTOLIC BLOOD PRESSURE: 97 MMHG | SYSTOLIC BLOOD PRESSURE: 168 MMHG

## 2024-05-23 VITALS — RESPIRATION RATE: 19 BRPM

## 2024-05-23 VITALS — RESPIRATION RATE: 18 BRPM

## 2024-05-23 VITALS — SYSTOLIC BLOOD PRESSURE: 148 MMHG | DIASTOLIC BLOOD PRESSURE: 91 MMHG | RESPIRATION RATE: 19 BRPM

## 2024-05-23 VITALS — DIASTOLIC BLOOD PRESSURE: 92 MMHG | RESPIRATION RATE: 19 BRPM | SYSTOLIC BLOOD PRESSURE: 135 MMHG

## 2024-05-23 VITALS — DIASTOLIC BLOOD PRESSURE: 66 MMHG | RESPIRATION RATE: 16 BRPM | SYSTOLIC BLOOD PRESSURE: 108 MMHG

## 2024-05-23 VITALS — RESPIRATION RATE: 12 BRPM

## 2024-05-23 VITALS — RESPIRATION RATE: 27 BRPM

## 2024-05-23 VITALS — RESPIRATION RATE: 16 BRPM

## 2024-05-23 VITALS — RESPIRATION RATE: 20 BRPM | DIASTOLIC BLOOD PRESSURE: 89 MMHG | SYSTOLIC BLOOD PRESSURE: 143 MMHG

## 2024-05-23 VITALS — SYSTOLIC BLOOD PRESSURE: 143 MMHG | DIASTOLIC BLOOD PRESSURE: 93 MMHG

## 2024-05-23 VITALS — RESPIRATION RATE: 17 BRPM

## 2024-05-23 VITALS — SYSTOLIC BLOOD PRESSURE: 146 MMHG | DIASTOLIC BLOOD PRESSURE: 96 MMHG | RESPIRATION RATE: 18 BRPM

## 2024-05-23 VITALS — RESPIRATION RATE: 15 BRPM

## 2024-05-23 VITALS — RESPIRATION RATE: 22 BRPM

## 2024-05-23 DIAGNOSIS — T79.6XXA: Primary | ICD-10-CM

## 2024-05-23 DIAGNOSIS — Z79.890: ICD-10-CM
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
## 2024-05-23 DIAGNOSIS — Z87.11: ICD-10-CM

## 2024-05-23 DIAGNOSIS — K25.9: ICD-10-CM

## 2024-05-23 DIAGNOSIS — S00.81XA: ICD-10-CM

## 2024-05-23 DIAGNOSIS — E03.9: ICD-10-CM

## 2024-05-23 DIAGNOSIS — Z87.891: ICD-10-CM

## 2024-05-23 DIAGNOSIS — R63.6: ICD-10-CM

## 2024-05-23 DIAGNOSIS — E78.00: ICD-10-CM
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
## 2024-05-23 DIAGNOSIS — K21.9: ICD-10-CM

## 2024-05-23 LAB
ALBUMIN SERPL-MCNC: 3.8 G/DL (ref 3.5–5)
ALP SERPL-CCNC: 150 U/L (ref 38–126)
ALT SERPL-CCNC: 29 U/L (ref 0–50)
AST SERPL-CCNC: 75 U/L (ref 17–59)
BUN SERPL-MCNC: 15 MG/DL (ref 9–20)
CALCIUM SERPL-MCNC: 9.9 MG/DL (ref 8.4–10.2)
CHLORIDE SERPL-SCNC: 101 MMOL/L (ref 98–107)
CO2 SERPL-SCNC: 28 MMOL/L (ref 22–30)
EGFR: > 60
ERYTHROCYTE [DISTWIDTH] IN BLOOD BY AUTOMATED COUNT: 14.3 % (ref 11.5–14.5)
ESTIMATED CREATININE CLEARANCE: 86 ML/MIN
GLUCOSE SERPL-MCNC: 122 MG/DL (ref 70–99)
HCT VFR BLD AUTO: 36.7 % (ref 39–52)
HGB BLD-MCNC: 12.4 G/DL (ref 13–18)
MCHC RBC AUTO-ENTMCNC: 33.8 G/DL (ref 33–37)
MCV RBC AUTO: 84.2 FL (ref 80–94)
NRBC BLD AUTO-RTO: 0 %
PLATELET # BLD AUTO: 210 10^3/UL (ref 130–400)
POTASSIUM SERPL-SCNC: 4 MMOL/L (ref 3.5–5.1)
PROT SERPL-MCNC: 6.4 G/DL (ref 6.3–8.2)
SODIUM SERPL-SCNC: 134 MMOL/L (ref 135–145)

## 2024-05-23 RX ADMIN — BACLOFEN 2.5 MG: 5 TABLET ORAL at 22:26

## 2024-05-23 RX ADMIN — SODIUM CHLORIDE 1000: 900 INJECTION, SOLUTION INTRAVENOUS at 14:37

## 2024-05-23 RX ADMIN — FINASTERIDE 5 MG: 5 TABLET, FILM COATED ORAL at 18:35

## 2024-05-23 RX ADMIN — DOCUSATE SODIUM 100 MG: 100 CAPSULE, LIQUID FILLED ORAL at 20:27

## 2024-05-23 RX ADMIN — SODIUM CHLORIDE 1000: 900 INJECTION, SOLUTION INTRAVENOUS at 18:31

## 2024-05-23 RX ADMIN — ENOXAPARIN SODIUM 40 MG: 40 INJECTION SUBCUTANEOUS at 18:34

## 2024-05-23 RX ADMIN — Medication 1 CAP: at 20:25

## 2024-05-24 VITALS — DIASTOLIC BLOOD PRESSURE: 83 MMHG | RESPIRATION RATE: 16 BRPM | SYSTOLIC BLOOD PRESSURE: 148 MMHG

## 2024-05-24 VITALS — SYSTOLIC BLOOD PRESSURE: 139 MMHG | RESPIRATION RATE: 16 BRPM | DIASTOLIC BLOOD PRESSURE: 81 MMHG

## 2024-05-24 VITALS — RESPIRATION RATE: 14 BRPM | SYSTOLIC BLOOD PRESSURE: 115 MMHG | DIASTOLIC BLOOD PRESSURE: 74 MMHG

## 2024-05-24 VITALS — DIASTOLIC BLOOD PRESSURE: 70 MMHG | SYSTOLIC BLOOD PRESSURE: 118 MMHG | RESPIRATION RATE: 16 BRPM

## 2024-05-24 VITALS — OXYGEN SATURATION: 97 % | HEART RATE: 81 BPM | DIASTOLIC BLOOD PRESSURE: 86 MMHG | SYSTOLIC BLOOD PRESSURE: 141 MMHG

## 2024-05-24 VITALS — OXYGEN SATURATION: 98 % | SYSTOLIC BLOOD PRESSURE: 141 MMHG | DIASTOLIC BLOOD PRESSURE: 68 MMHG

## 2024-05-24 VITALS — DIASTOLIC BLOOD PRESSURE: 83 MMHG | SYSTOLIC BLOOD PRESSURE: 116 MMHG | RESPIRATION RATE: 17 BRPM

## 2024-05-24 VITALS — SYSTOLIC BLOOD PRESSURE: 124 MMHG | RESPIRATION RATE: 20 BRPM | DIASTOLIC BLOOD PRESSURE: 82 MMHG

## 2024-05-24 LAB
ALBUMIN SERPL-MCNC: 3.1 G/DL (ref 3.5–5)
ALP SERPL-CCNC: 115 U/L (ref 38–126)
ALT SERPL-CCNC: 27 U/L (ref 0–50)
AST SERPL-CCNC: 59 U/L (ref 17–59)
BUN SERPL-MCNC: 13 MG/DL (ref 9–20)
CALCIUM SERPL-MCNC: 9.5 MG/DL (ref 8.4–10.2)
CHLORIDE SERPL-SCNC: 106 MMOL/L (ref 98–107)
CO2 SERPL-SCNC: 24 MMOL/L (ref 22–30)
EGFR: > 60
ERYTHROCYTE [DISTWIDTH] IN BLOOD BY AUTOMATED COUNT: 14.4 % (ref 11.5–14.5)
ESTIMATED CREATININE CLEARANCE: 73 ML/MIN
GLUCOSE SERPL-MCNC: 88 MG/DL (ref 70–99)
HCT VFR BLD AUTO: 34.5 % (ref 39–52)
HGB BLD-MCNC: 11.6 G/DL (ref 13–18)
MCHC RBC AUTO-ENTMCNC: 33.6 G/DL (ref 33–37)
MCV RBC AUTO: 85.6 FL (ref 80–94)
PLATELET # BLD AUTO: 196 10^3/UL (ref 130–400)
POTASSIUM SERPL-SCNC: 3.8 MMOL/L (ref 3.5–5.1)
PROT SERPL-MCNC: 5.6 G/DL (ref 6.3–8.2)
SODIUM SERPL-SCNC: 137 MMOL/L (ref 135–145)

## 2024-05-24 RX ADMIN — LACTULOSE 20 GRAMS: 20 SOLUTION ORAL at 15:15

## 2024-05-24 RX ADMIN — CYANOCOBALAMIN TAB 1000 MCG 1000 MCG: 1000 TAB at 08:25

## 2024-05-24 RX ADMIN — Medication 25 MCG: at 08:25

## 2024-05-24 RX ADMIN — Medication 1 CAP: at 08:25

## 2024-05-24 RX ADMIN — POLYETHYLENE GLYCOL 3350 17 GRAMS: 17 POWDER, FOR SOLUTION ORAL at 08:25

## 2024-05-24 RX ADMIN — BACLOFEN 2.5 MG: 5 TABLET ORAL at 21:05

## 2024-05-24 RX ADMIN — ACETAMINOPHEN 1000 MG: 500 TABLET ORAL at 15:15

## 2024-05-24 RX ADMIN — Medication 1 CAP: at 21:05

## 2024-05-24 RX ADMIN — SENNOSIDES 8.6 MG: 8.6 TABLET ORAL at 08:25

## 2024-05-24 RX ADMIN — DOCUSATE SODIUM 100 MG: 100 CAPSULE, LIQUID FILLED ORAL at 08:25

## 2024-05-24 RX ADMIN — FINASTERIDE 5 MG: 5 TABLET, FILM COATED ORAL at 17:18

## 2024-05-24 RX ADMIN — LACTULOSE 20 GRAMS: 20 SOLUTION ORAL at 21:05

## 2024-05-24 RX ADMIN — ASPIRIN 81 MG: 81 TABLET, CHEWABLE ORAL at 08:25

## 2024-05-24 RX ADMIN — ACETAMINOPHEN 1000 MG: 500 TABLET ORAL at 21:04

## 2024-05-24 RX ADMIN — SODIUM CHLORIDE 1000: 900 INJECTION, SOLUTION INTRAVENOUS at 05:42

## 2024-05-24 RX ADMIN — LEVOTHYROXINE SODIUM 100 MCG: 0.1 TABLET ORAL at 05:11

## 2024-05-24 RX ADMIN — PANTOPRAZOLE SODIUM 40 MG: 40 TABLET, DELAYED RELEASE ORAL at 08:25

## 2024-05-24 RX ADMIN — POLYETHYLENE GLYCOL 3350 17 GRAMS: 17 POWDER, FOR SOLUTION ORAL at 21:05

## 2024-05-24 RX ADMIN — ENOXAPARIN SODIUM 40 MG: 40 INJECTION SUBCUTANEOUS at 17:17

## 2024-05-24 RX ADMIN — DOCUSATE SODIUM 100 MG: 100 CAPSULE, LIQUID FILLED ORAL at 21:04

## 2024-05-24 RX ADMIN — ACETAMINOPHEN 1000 MG: 500 TABLET ORAL at 10:16

## 2024-05-24 RX ADMIN — SODIUM CHLORIDE 1000: 900 INJECTION, SOLUTION INTRAVENOUS at 17:56

## 2024-05-24 RX ADMIN — PYRIDOXINE HCL TAB 50 MG 100 MG: 50 TAB at 08:25

## 2024-05-25 VITALS — DIASTOLIC BLOOD PRESSURE: 82 MMHG | HEART RATE: 78 BPM | SYSTOLIC BLOOD PRESSURE: 148 MMHG

## 2024-05-25 VITALS — SYSTOLIC BLOOD PRESSURE: 126 MMHG | DIASTOLIC BLOOD PRESSURE: 70 MMHG | RESPIRATION RATE: 18 BRPM

## 2024-05-25 VITALS — SYSTOLIC BLOOD PRESSURE: 154 MMHG | DIASTOLIC BLOOD PRESSURE: 96 MMHG | RESPIRATION RATE: 16 BRPM

## 2024-05-25 VITALS — RESPIRATION RATE: 16 BRPM | DIASTOLIC BLOOD PRESSURE: 96 MMHG | SYSTOLIC BLOOD PRESSURE: 149 MMHG

## 2024-05-25 VITALS — RESPIRATION RATE: 16 BRPM | DIASTOLIC BLOOD PRESSURE: 76 MMHG | SYSTOLIC BLOOD PRESSURE: 131 MMHG

## 2024-05-25 VITALS — RESPIRATION RATE: 16 BRPM | SYSTOLIC BLOOD PRESSURE: 122 MMHG | DIASTOLIC BLOOD PRESSURE: 81 MMHG

## 2024-05-25 VITALS — RESPIRATION RATE: 18 BRPM | SYSTOLIC BLOOD PRESSURE: 139 MMHG | DIASTOLIC BLOOD PRESSURE: 89 MMHG

## 2024-05-25 LAB
ALBUMIN SERPL-MCNC: 2.9 G/DL (ref 3.5–5)
ALP SERPL-CCNC: 106 U/L (ref 38–126)
ALT SERPL-CCNC: 30 U/L (ref 0–50)
AST SERPL-CCNC: 47 U/L (ref 17–59)
BUN SERPL-MCNC: 12 MG/DL (ref 9–20)
CALCIUM SERPL-MCNC: 9.2 MG/DL (ref 8.4–10.2)
CHLORIDE SERPL-SCNC: 110 MMOL/L (ref 98–107)
CO2 SERPL-SCNC: 23 MMOL/L (ref 22–30)
EGFR: > 60
ERYTHROCYTE [DISTWIDTH] IN BLOOD BY AUTOMATED COUNT: 14.8 % (ref 11.5–14.5)
ESTIMATED CREATININE CLEARANCE: 73 ML/MIN
GLUCOSE SERPL-MCNC: 90 MG/DL (ref 70–99)
HCT VFR BLD AUTO: 31.7 % (ref 39–52)
HGB BLD-MCNC: 10.9 G/DL (ref 13–18)
MCHC RBC AUTO-ENTMCNC: 34.4 G/DL (ref 33–37)
MCV RBC AUTO: 84.3 FL (ref 80–94)
PLATELET # BLD AUTO: 190 10^3/UL (ref 130–400)
POTASSIUM SERPL-SCNC: 3.6 MMOL/L (ref 3.5–5.1)
PROT SERPL-MCNC: 5.3 G/DL (ref 6.3–8.2)
SODIUM SERPL-SCNC: 138 MMOL/L (ref 135–145)

## 2024-05-25 RX ADMIN — DOCUSATE SODIUM 100 MG: 100 CAPSULE, LIQUID FILLED ORAL at 10:57

## 2024-05-25 RX ADMIN — Medication 25 MCG: at 10:59

## 2024-05-25 RX ADMIN — Medication 1 CAP: at 10:58

## 2024-05-25 RX ADMIN — PANTOPRAZOLE SODIUM 40 MG: 40 TABLET, DELAYED RELEASE ORAL at 10:58

## 2024-05-25 RX ADMIN — ACETAMINOPHEN 1000 MG: 500 TABLET ORAL at 22:32

## 2024-05-25 RX ADMIN — LEVOTHYROXINE SODIUM 100 MCG: 0.1 TABLET ORAL at 05:21

## 2024-05-25 RX ADMIN — SENNOSIDES: 8.6 TABLET ORAL at 10:57

## 2024-05-25 RX ADMIN — ENOXAPARIN SODIUM 40 MG: 40 INJECTION SUBCUTANEOUS at 18:21

## 2024-05-25 RX ADMIN — DOCUSATE SODIUM: 100 CAPSULE, LIQUID FILLED ORAL at 20:26

## 2024-05-25 RX ADMIN — ACETAMINOPHEN 1000 MG: 500 TABLET ORAL at 15:58

## 2024-05-25 RX ADMIN — POLYETHYLENE GLYCOL 3350: 17 POWDER, FOR SOLUTION ORAL at 10:57

## 2024-05-25 RX ADMIN — CYANOCOBALAMIN TAB 1000 MCG 1000 MCG: 1000 TAB at 10:58

## 2024-05-25 RX ADMIN — ACETAMINOPHEN 1000 MG: 500 TABLET ORAL at 11:01

## 2024-05-25 RX ADMIN — PYRIDOXINE HCL TAB 50 MG 100 MG: 50 TAB at 10:59

## 2024-05-25 RX ADMIN — BACLOFEN 2.5 MG: 5 TABLET ORAL at 20:51

## 2024-05-25 RX ADMIN — FINASTERIDE 5 MG: 5 TABLET, FILM COATED ORAL at 18:21

## 2024-05-25 RX ADMIN — POLYETHYLENE GLYCOL 3350: 17 POWDER, FOR SOLUTION ORAL at 20:27

## 2024-05-25 RX ADMIN — Medication 1 CAP: at 20:26

## 2024-05-25 RX ADMIN — LACTULOSE: 20 SOLUTION ORAL at 10:55

## 2024-05-25 RX ADMIN — LACTULOSE: 20 SOLUTION ORAL at 20:27

## 2024-05-25 RX ADMIN — ASPIRIN 81 MG: 81 TABLET, CHEWABLE ORAL at 10:58

## 2024-05-26 VITALS — RESPIRATION RATE: 18 BRPM | SYSTOLIC BLOOD PRESSURE: 140 MMHG | DIASTOLIC BLOOD PRESSURE: 87 MMHG

## 2024-05-26 VITALS — SYSTOLIC BLOOD PRESSURE: 145 MMHG | DIASTOLIC BLOOD PRESSURE: 82 MMHG | RESPIRATION RATE: 18 BRPM

## 2024-05-26 VITALS — DIASTOLIC BLOOD PRESSURE: 80 MMHG | RESPIRATION RATE: 18 BRPM | SYSTOLIC BLOOD PRESSURE: 145 MMHG

## 2024-05-26 VITALS — HEART RATE: 71 BPM | DIASTOLIC BLOOD PRESSURE: 79 MMHG | SYSTOLIC BLOOD PRESSURE: 141 MMHG | RESPIRATION RATE: 18 BRPM

## 2024-05-26 VITALS — DIASTOLIC BLOOD PRESSURE: 89 MMHG | RESPIRATION RATE: 20 BRPM | SYSTOLIC BLOOD PRESSURE: 129 MMHG

## 2024-05-26 LAB
ERYTHROCYTE [DISTWIDTH] IN BLOOD BY AUTOMATED COUNT: 14.7 % (ref 11.5–14.5)
HCT VFR BLD AUTO: 35.4 % (ref 39–52)
HGB BLD-MCNC: 12 G/DL (ref 13–18)
MCHC RBC AUTO-ENTMCNC: 33.9 G/DL (ref 33–37)
MCV RBC AUTO: 84.9 FL (ref 80–94)
PLATELET # BLD AUTO: 217 10^3/UL (ref 130–400)

## 2024-05-26 RX ADMIN — ACETAMINOPHEN 1000 MG: 500 TABLET ORAL at 15:25

## 2024-05-26 RX ADMIN — PYRIDOXINE HCL TAB 50 MG 100 MG: 50 TAB at 10:03

## 2024-05-26 RX ADMIN — SENNOSIDES 8.6 MG: 8.6 TABLET ORAL at 10:02

## 2024-05-26 RX ADMIN — FINASTERIDE 5 MG: 5 TABLET, FILM COATED ORAL at 17:45

## 2024-05-26 RX ADMIN — FAMOTIDINE 20 MG: 20 TABLET, FILM COATED ORAL at 22:04

## 2024-05-26 RX ADMIN — ACETAMINOPHEN 1000 MG: 500 TABLET ORAL at 08:40

## 2024-05-26 RX ADMIN — CYANOCOBALAMIN TAB 1000 MCG 1000 MCG: 1000 TAB at 10:03

## 2024-05-26 RX ADMIN — PANTOPRAZOLE SODIUM 40 MG: 40 TABLET, DELAYED RELEASE ORAL at 10:02

## 2024-05-26 RX ADMIN — FAMOTIDINE 20 MG: 20 TABLET, FILM COATED ORAL at 17:46

## 2024-05-26 RX ADMIN — Medication 25 MCG: at 10:03

## 2024-05-26 RX ADMIN — LACTULOSE 20 GRAMS: 20 SOLUTION ORAL at 09:57

## 2024-05-26 RX ADMIN — LACTULOSE 20 GRAMS: 20 SOLUTION ORAL at 20:14

## 2024-05-26 RX ADMIN — BACLOFEN 2.5 MG: 5 TABLET ORAL at 20:14

## 2024-05-26 RX ADMIN — ENOXAPARIN SODIUM 40 MG: 40 INJECTION SUBCUTANEOUS at 17:46

## 2024-05-26 RX ADMIN — DOCUSATE SODIUM 100 MG: 100 CAPSULE, LIQUID FILLED ORAL at 20:14

## 2024-05-26 RX ADMIN — Medication 1 CAP: at 10:01

## 2024-05-26 RX ADMIN — ASPIRIN 81 MG: 81 TABLET, CHEWABLE ORAL at 10:01

## 2024-05-26 RX ADMIN — ACETAMINOPHEN 1000 MG: 500 TABLET ORAL at 22:04

## 2024-05-26 RX ADMIN — DOCUSATE SODIUM 100 MG: 100 CAPSULE, LIQUID FILLED ORAL at 10:01

## 2024-05-26 RX ADMIN — LEVOTHYROXINE SODIUM 100 MCG: 0.1 TABLET ORAL at 05:21

## 2024-05-26 RX ADMIN — BACLOFEN 2.5 MG: 5 TABLET ORAL at 13:08

## 2024-05-26 RX ADMIN — Medication 1 CAP: at 20:15

## 2024-05-26 RX ADMIN — POLYETHYLENE GLYCOL 3350 17 GRAMS: 17 POWDER, FOR SOLUTION ORAL at 09:58

## 2024-05-26 RX ADMIN — POLYETHYLENE GLYCOL 3350 17 GRAMS: 17 POWDER, FOR SOLUTION ORAL at 20:16

## 2024-05-27 VITALS — RESPIRATION RATE: 16 BRPM | SYSTOLIC BLOOD PRESSURE: 168 MMHG | DIASTOLIC BLOOD PRESSURE: 75 MMHG

## 2024-05-27 VITALS — SYSTOLIC BLOOD PRESSURE: 140 MMHG | DIASTOLIC BLOOD PRESSURE: 91 MMHG | RESPIRATION RATE: 18 BRPM

## 2024-05-27 VITALS — RESPIRATION RATE: 16 BRPM | DIASTOLIC BLOOD PRESSURE: 70 MMHG | SYSTOLIC BLOOD PRESSURE: 128 MMHG

## 2024-05-27 RX ADMIN — PYRIDOXINE HCL TAB 50 MG 100 MG: 50 TAB at 08:39

## 2024-05-27 RX ADMIN — ACETAMINOPHEN 1000 MG: 500 TABLET ORAL at 16:33

## 2024-05-27 RX ADMIN — SENNOSIDES 8.6 MG: 8.6 TABLET ORAL at 08:39

## 2024-05-27 RX ADMIN — BACLOFEN 2.5 MG: 5 TABLET ORAL at 19:47

## 2024-05-27 RX ADMIN — CYANOCOBALAMIN TAB 1000 MCG 1000 MCG: 1000 TAB at 08:39

## 2024-05-27 RX ADMIN — ENOXAPARIN SODIUM 40 MG: 40 INJECTION SUBCUTANEOUS at 16:33

## 2024-05-27 RX ADMIN — Medication 1 CAP: at 08:39

## 2024-05-27 RX ADMIN — Medication 1 CAP: at 19:47

## 2024-05-27 RX ADMIN — ACETAMINOPHEN 1000 MG: 500 TABLET ORAL at 22:43

## 2024-05-27 RX ADMIN — LACTULOSE 20 GRAMS: 20 SOLUTION ORAL at 19:47

## 2024-05-27 RX ADMIN — PANTOPRAZOLE SODIUM 40 MG: 40 TABLET, DELAYED RELEASE ORAL at 08:38

## 2024-05-27 RX ADMIN — FAMOTIDINE 20 MG: 20 TABLET, FILM COATED ORAL at 22:43

## 2024-05-27 RX ADMIN — DOCUSATE SODIUM 100 MG: 100 CAPSULE, LIQUID FILLED ORAL at 08:40

## 2024-05-27 RX ADMIN — FINASTERIDE 5 MG: 5 TABLET, FILM COATED ORAL at 16:39

## 2024-05-27 RX ADMIN — POLYETHYLENE GLYCOL 3350 17 GRAMS: 17 POWDER, FOR SOLUTION ORAL at 08:40

## 2024-05-27 RX ADMIN — ASPIRIN 81 MG: 81 TABLET, CHEWABLE ORAL at 08:39

## 2024-05-27 RX ADMIN — POLYETHYLENE GLYCOL 3350 17 GRAMS: 17 POWDER, FOR SOLUTION ORAL at 19:47

## 2024-05-27 RX ADMIN — Medication 25 MCG: at 08:39

## 2024-05-27 RX ADMIN — LACTULOSE 20 GRAMS: 20 SOLUTION ORAL at 08:38

## 2024-05-27 RX ADMIN — DOCUSATE SODIUM 100 MG: 100 CAPSULE, LIQUID FILLED ORAL at 19:48

## 2024-05-27 RX ADMIN — LEVOTHYROXINE SODIUM 100 MCG: 0.1 TABLET ORAL at 06:09

## 2024-05-27 RX ADMIN — BACLOFEN 2.5 MG: 5 TABLET ORAL at 08:38

## 2024-05-27 RX ADMIN — ACETAMINOPHEN 1000 MG: 500 TABLET ORAL at 08:39

## 2024-05-28 VITALS — RESPIRATION RATE: 16 BRPM | DIASTOLIC BLOOD PRESSURE: 77 MMHG | SYSTOLIC BLOOD PRESSURE: 143 MMHG

## 2024-05-28 VITALS — RESPIRATION RATE: 16 BRPM | SYSTOLIC BLOOD PRESSURE: 127 MMHG | DIASTOLIC BLOOD PRESSURE: 61 MMHG

## 2024-05-28 VITALS — RESPIRATION RATE: 16 BRPM | DIASTOLIC BLOOD PRESSURE: 84 MMHG | SYSTOLIC BLOOD PRESSURE: 151 MMHG

## 2024-05-28 RX ADMIN — LEVOTHYROXINE SODIUM 100 MCG: 0.1 TABLET ORAL at 04:09

## 2024-05-28 RX ADMIN — ACETAMINOPHEN 1000 MG: 500 TABLET ORAL at 09:20

## 2024-05-28 RX ADMIN — POLYETHYLENE GLYCOL 3350 17 GRAMS: 17 POWDER, FOR SOLUTION ORAL at 09:20

## 2024-05-28 RX ADMIN — LIDOCAINE 1 PATCH: 4 PATCH TOPICAL at 22:44

## 2024-05-28 RX ADMIN — CYANOCOBALAMIN TAB 1000 MCG 1000 MCG: 1000 TAB at 09:20

## 2024-05-28 RX ADMIN — DOCUSATE SODIUM 100 MG: 100 CAPSULE, LIQUID FILLED ORAL at 09:08

## 2024-05-28 RX ADMIN — BACLOFEN 2.5 MG: 5 TABLET ORAL at 21:36

## 2024-05-28 RX ADMIN — ENOXAPARIN SODIUM 40 MG: 40 INJECTION SUBCUTANEOUS at 17:07

## 2024-05-28 RX ADMIN — LACTULOSE 20 GRAMS: 20 SOLUTION ORAL at 21:43

## 2024-05-28 RX ADMIN — Medication 25 MCG: at 09:20

## 2024-05-28 RX ADMIN — Medication 1 CAP: at 21:37

## 2024-05-28 RX ADMIN — PYRIDOXINE HCL TAB 50 MG 100 MG: 50 TAB at 09:08

## 2024-05-28 RX ADMIN — FINASTERIDE 5 MG: 5 TABLET, FILM COATED ORAL at 17:08

## 2024-05-28 RX ADMIN — Medication 1 CAP: at 09:08

## 2024-05-28 RX ADMIN — LACTULOSE 20 GRAMS: 20 SOLUTION ORAL at 09:06

## 2024-05-28 RX ADMIN — POLYETHYLENE GLYCOL 3350 17 GRAMS: 17 POWDER, FOR SOLUTION ORAL at 21:43

## 2024-05-28 RX ADMIN — PANTOPRAZOLE SODIUM 40 MG: 40 TABLET, DELAYED RELEASE ORAL at 09:07

## 2024-05-28 RX ADMIN — DOCUSATE SODIUM 100 MG: 100 CAPSULE, LIQUID FILLED ORAL at 21:43

## 2024-05-28 RX ADMIN — FAMOTIDINE 20 MG: 20 TABLET, FILM COATED ORAL at 22:44

## 2024-05-28 RX ADMIN — ACETAMINOPHEN 1000 MG: 500 TABLET ORAL at 15:39

## 2024-05-28 RX ADMIN — ACETAMINOPHEN 1000 MG: 500 TABLET ORAL at 22:44

## 2024-05-28 RX ADMIN — SENNOSIDES 8.6 MG: 8.6 TABLET ORAL at 09:09

## 2024-05-28 RX ADMIN — BACLOFEN 2.5 MG: 5 TABLET ORAL at 09:06

## 2024-05-28 RX ADMIN — ASPIRIN 81 MG: 81 TABLET, CHEWABLE ORAL at 09:07

## 2024-05-28 RX ADMIN — LIDOCAINE 1 PATCH: 4 PATCH TOPICAL at 04:03

## 2024-05-29 VITALS — SYSTOLIC BLOOD PRESSURE: 162 MMHG | DIASTOLIC BLOOD PRESSURE: 93 MMHG | RESPIRATION RATE: 18 BRPM

## 2024-05-29 VITALS — SYSTOLIC BLOOD PRESSURE: 110 MMHG | DIASTOLIC BLOOD PRESSURE: 75 MMHG | RESPIRATION RATE: 17 BRPM

## 2024-05-29 RX ADMIN — BACLOFEN 2.5 MG: 5 TABLET ORAL at 06:21

## 2024-05-29 RX ADMIN — ACETAMINOPHEN 1000 MG: 500 TABLET ORAL at 09:50

## 2024-05-29 RX ADMIN — PYRIDOXINE HCL TAB 50 MG 100 MG: 50 TAB at 09:52

## 2024-05-29 RX ADMIN — Medication 1 CAP: at 09:51

## 2024-05-29 RX ADMIN — PANTOPRAZOLE SODIUM 40 MG: 40 TABLET, DELAYED RELEASE ORAL at 09:52

## 2024-05-29 RX ADMIN — SENNOSIDES 8.6 MG: 8.6 TABLET ORAL at 09:53

## 2024-05-29 RX ADMIN — LEVOTHYROXINE SODIUM 100 MCG: 0.1 TABLET ORAL at 05:50

## 2024-05-29 RX ADMIN — POLYETHYLENE GLYCOL 3350 17 GRAMS: 17 POWDER, FOR SOLUTION ORAL at 09:54

## 2024-05-29 RX ADMIN — ASPIRIN 81 MG: 81 TABLET, CHEWABLE ORAL at 09:52

## 2024-05-29 RX ADMIN — DOCUSATE SODIUM 100 MG: 100 CAPSULE, LIQUID FILLED ORAL at 09:53

## 2024-05-29 RX ADMIN — LACTULOSE 20 GRAMS: 20 SOLUTION ORAL at 09:53

## 2024-05-29 RX ADMIN — CYANOCOBALAMIN TAB 1000 MCG 1000 MCG: 1000 TAB at 09:52

## 2024-05-29 RX ADMIN — Medication 25 MCG: at 09:54

## 2024-07-13 ENCOUNTER — HOSPITAL ENCOUNTER (EMERGENCY)
Dept: HOSPITAL 99 - EMR | Age: 84
Discharge: HOME | End: 2024-07-13
Payer: COMMERCIAL

## 2024-07-13 VITALS — DIASTOLIC BLOOD PRESSURE: 82 MMHG | SYSTOLIC BLOOD PRESSURE: 134 MMHG

## 2024-07-13 VITALS — RESPIRATION RATE: 18 BRPM
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
## 2024-07-13 DIAGNOSIS — E03.9: ICD-10-CM

## 2024-07-13 DIAGNOSIS — Z79.82: ICD-10-CM

## 2024-07-13 DIAGNOSIS — I69.320: ICD-10-CM

## 2024-07-13 DIAGNOSIS — Z87.891: ICD-10-CM

## 2024-07-13 DIAGNOSIS — S20.211A: Primary | ICD-10-CM

## 2024-07-13 DIAGNOSIS — I10: ICD-10-CM

## 2024-07-13 DIAGNOSIS — E78.00: ICD-10-CM

## 2024-07-13 PROCEDURE — 99283 EMERGENCY DEPT VISIT LOW MDM: CPT

## 2024-07-13 RX ADMIN — ACETAMINOPHEN 325 MG: 325 TABLET ORAL at 14:27

## 2024-08-24 ENCOUNTER — HOSPITAL ENCOUNTER (EMERGENCY)
Dept: HOSPITAL 99 - EMR | Age: 84
Discharge: HOME | End: 2024-08-24
Payer: COMMERCIAL

## 2024-08-24 VITALS — SYSTOLIC BLOOD PRESSURE: 143 MMHG | DIASTOLIC BLOOD PRESSURE: 73 MMHG

## 2024-08-24 VITALS — SYSTOLIC BLOOD PRESSURE: 142 MMHG | DIASTOLIC BLOOD PRESSURE: 86 MMHG

## 2024-08-24 VITALS — DIASTOLIC BLOOD PRESSURE: 61 MMHG | SYSTOLIC BLOOD PRESSURE: 112 MMHG | RESPIRATION RATE: 18 BRPM

## 2024-08-24 VITALS — BODY MASS INDEX: 25.8 KG/M2

## 2024-08-24 DIAGNOSIS — K59.00: ICD-10-CM

## 2024-08-24 DIAGNOSIS — Z86.73: ICD-10-CM

## 2024-08-24 DIAGNOSIS — N40.1: ICD-10-CM

## 2024-08-24 DIAGNOSIS — E78.00: ICD-10-CM

## 2024-08-24 DIAGNOSIS — Z87.440: ICD-10-CM

## 2024-08-24 DIAGNOSIS — N39.0: Primary | ICD-10-CM

## 2024-08-24 DIAGNOSIS — R33.8: ICD-10-CM

## 2024-08-24 DIAGNOSIS — Z87.891: ICD-10-CM

## 2024-08-24 DIAGNOSIS — E03.9: ICD-10-CM

## 2024-08-24 DIAGNOSIS — Z82.49: ICD-10-CM

## 2024-08-24 DIAGNOSIS — I10: ICD-10-CM

## 2024-08-24 LAB
ALBUMIN SERPL-MCNC: 4.6 G/DL (ref 3.5–5)
ALP SERPL-CCNC: 127 U/L (ref 38–126)
ALT SERPL-CCNC: 35 U/L (ref 0–50)
AST SERPL-CCNC: 61 U/L (ref 17–59)
BUN SERPL-MCNC: 15 MG/DL (ref 9–20)
CALCIUM SERPL-MCNC: 10.4 MG/DL (ref 8.4–10.2)
CHLORIDE SERPL-SCNC: 101 MMOL/L (ref 98–107)
CO2 SERPL-SCNC: 21 MMOL/L (ref 22–30)
EGFR: > 60
ERYTHROCYTE [DISTWIDTH] IN BLOOD BY AUTOMATED COUNT: 14.6 % (ref 11.5–14.5)
GLUCOSE SERPL-MCNC: 164 MG/DL (ref 70–99)
HCT VFR BLD AUTO: 37.1 % (ref 39–52)
HGB BLD-MCNC: 13 G/DL (ref 13–18)
LIPASE SERPL-CCNC: 70 U/L (ref 23–300)
MCHC RBC AUTO-ENTMCNC: 35 G/DL (ref 33–37)
MCV RBC AUTO: 84.7 FL (ref 80–94)
NRBC BLD AUTO-RTO: 0 %
PLATELET # BLD AUTO: 224 10^3/UL (ref 130–400)
POTASSIUM SERPL-SCNC: 3.9 MMOL/L (ref 3.5–5.1)
PROT SERPL-MCNC: 7 G/DL (ref 6.3–8.2)
SODIUM SERPL-SCNC: 137 MMOL/L (ref 135–145)
URINE RED BLOOD CELL: (no result) /HPF (ref 0–2)
URINE WHITE CELL: (no result) /HPF (ref 0–5)

## 2024-08-24 PROCEDURE — 99283 EMERGENCY DEPT VISIT LOW MDM: CPT

## 2024-08-24 RX ADMIN — CEFDINIR 300 MG: 300 CAPSULE ORAL at 17:30

## 2024-08-24 RX ADMIN — MINERAL OIL 133 ML: 100 ENEMA RECTAL at 17:31

## 2024-09-17 ENCOUNTER — EVALUATION (OUTPATIENT)
Dept: PHYSICAL THERAPY | Facility: CLINIC | Age: 84
End: 2024-09-17
Payer: COMMERCIAL

## 2024-09-17 DIAGNOSIS — R26.81 GAIT INSTABILITY: Primary | ICD-10-CM

## 2024-09-17 DIAGNOSIS — R53.1 WEAKNESS GENERALIZED: ICD-10-CM

## 2024-09-17 PROCEDURE — 97163 PT EVAL HIGH COMPLEX 45 MIN: CPT | Performed by: PHYSICAL THERAPIST

## 2024-09-17 NOTE — PROGRESS NOTES
PT Evaluation     Today's date: 2024  Patient name: Baldomero Camargo  : 1940  MRN: [id number]  Referring provider: Kotlon Trevizo  Dx:   Encounter Diagnosis     ICD-10-CM    1. Gait instability  R26.81       2. Weakness generalized  R53.1                  Assessment  Impairments: abnormal gait, abnormal or restricted ROM, activity intolerance, impaired physical strength, lacks appropriate home exercise program, pain with function, weight-bearing intolerance, poor posture  and poor body mechanics  Functional limitations: walk/stand with RW to >10 min, STS, stairs, decrease fall risk    Assessment details: Baldomero Camargo is a 84 y.o. male who presents with pain, decreased strength, decreased ROM, ambulatory dysfunction, and balance dysfunction. Due to these impairments, patient has difficulty performing ADL's, ambulation, stair negotiation, transfers. Patient's clinical presentation is consistent with their referring diagnosis of Gait instability  (primary encounter diagnosis), Weakness generalized. Patient has been educated in home exercise program and plan of care. Patient would benefit from skilled physical therapy services to address their aforementioned functional limitations and progress towards prior level of function and independence with home exercise program.        Prognosis: poor  Prognosis details: + factors: high motivation levels  - factors: age, stroke, recent R LEANA as result of fall, history of L hip ORIF, history of b/l TKA's    Goals  Short Term Goals to be accomplished by 10/15/24:  STG1: Pt will be I with HEP  STG2: Pt will demo TUG <42 sec to demonstrate improved gait speed.  STG3: Pt will demo 4/5 MMT strength in knee to improve stair negotiation.   STG4: Pt will demonstrate 5x STS with 1 foam with b/l UE assist  <18 sec, to demonstrate improved LE strength.      Long Term Goals to be accomplished by 12/10/24:   LTG1: Pt will be able to perform TUG test <35 sec to demonstrate improved  gait speed.  LTG2: Pt will be able to perform 5x STS test with 1 foam w/ b/l UE assist <14 sec to demonstrate improved LE strength.  LTG3: Pt will note subjective report of 50% reduction in fear of falling.       Plan  Patient would benefit from: PT eval and skilled physical therapy  Planned modality interventions: cryotherapy, thermotherapy: hydrocollator packs and unattended electrical stimulation    Planned therapy interventions: manual therapy, neuromuscular re-education, self care, therapeutic activities, therapeutic exercise, home exercise program, patient education, joint mobilization, balance, strengthening, stretching and therapeutic training    Frequency: 2x week  Duration in weeks: 12  Plan of Care beginning date: 9/17/2024  Plan of Care expiration date: 12/10/2024  Treatment plan discussed with: patient and caregiver  Plan details: HEP development, stretching, strengthening, AROM, posture education, STM/MI as needed to reduce muscle tension, muscle reeducation, PLOC discussed and agreed upon with patient and caregiver (his wife).            Subjective Evaluation    History of Present Illness  Mechanism of injury: Pt is a 83 y/o male who presents to PT with his wife with prescription for gait instability and weakness. Baldomero and his wife are uncertain of most details, however said they were generally confident over the details provided.   They stated that he had a stroke Dec 2023, and went to [facility] inpatient rehab for about 2.5 months at. Where he was using walker at end of that time. They noted that he was walking about from living room to ward way thru kitchen/dining room then back to living room. Unfortunately soon after that he fell and broke R hip March/April. He was in [facility] where they stated a R LEANA was performed. They were uncertain if he went to inpatient rehab, but stated that he was hospitalized about a month. Sounded like from what they were saying that he went to a skilled  [facility] for about 1-2 months after that. Stated that after that he had home care PT 3x a week for about a month and a half and completed about 2 weeks ago.   Went to PCP on 24 where he was prescribed PT.   (Occasionally goes up stairs for exercise, however has bathroom and bedroom on first level where he mostly stays)  Medical history: Stroke Dec 2023, history of TKA's b/l, history of L hip fracture prior to stroke, R LEANA after stroke due to fall and fractured hip    Had rails installed in bathtub so he can  bath tub. Has 2 handrails for 3 stairs to enter home.     Patient Goals  Patient goals for therapy: increased strength, improved balance, increased motion and independence with ADLs/IADLs  Patient goal: walk/stand with RW to >10 min, STS, stairs, decrease fall risk  Pain  Current pain ratin  At best pain ratin  At worst pain ratin  Location: R hip, occasionally in L leg  Quality: tight  Relieving factors: relaxation and rest  Aggravating factors: standing, walking and stair climbing    Social Support  Steps to enter house: yes  3  Stairs in house: yes   15  Lives in: multiple-level home  Lives with: spouse    Treatments  Current treatment: physical therapy        Objective     Strength/Myotome Testing     Left Hip   Planes of Motion   Flexion: 4    Right Hip   Planes of Motion   Flexion: 3-    Left Knee   Flexion: 4  Extension: 4-    Right Knee   Flexion: 4  Extension: 3+    Left Ankle/Foot   Dorsiflexion: 4+  Plantar flexion: 4    Right Ankle/Foot   Dorsiflexion: 4+  Plantar flexion: 4  Neuro Exam:     Functional outcomes   5x sit to stand: 21 sec, 2UE arm rests, 1 foam (seconds)  TU (seconds)  Functional outcome comment: 30 sec STS: NT     5x STS:   0UE assist, 2 foam, only able to perform 4 reps in 30 sec, noted R hip/knee pain    Functional outcome gait comment: Gait: RW, slow step to gait, leads with R, uncoordinated sequencing, signficant difficulty with swing phase with  "LLE, reports progressive increase in R hip pain during walking and standing with increased time.    BP: 114/78    NBOS: NT         Precautions:   B/l TKA history   Fall: L hip ORIF prior to stroke  Stroke Dec 2023 requiring hospitalization and inpatient rehab  Fall: R hip fracture resulting in R LEANA    Fall Risk      Manuals 9/17                                            Neuro Re-Ed 9/17        NBOS                                                               Ther Ex 9/17        Heel raises/Toe raise Pt reported was in HEP given by home care PT -not reviewed today        Squat hold counter Pt reported was in HEP given by home care PT -not reviewed today        Stand knee flexion Pt reported was in HEP given by home care PT -not reviewed today        Stand hip abduction Pt reported was in HEP given by home care PT -not reviewed today        march Pt reported was in HEP given by home care PT -not reviewed today                                   Ther Activity 9/17        STS  2 foam 0UE assist - CG / CS : Not performed         Step up 2\" not performed         Partial squat Not performed                                                           "

## 2024-09-17 NOTE — LETTER
2024    Kolton Trevizo  [address] [facility] [place], [place].[address] [place] [id number]    Patient: Baldomero Camargo   YOB: 1940   Date of Visit: 2024     Encounter Diagnosis     ICD-10-CM    1. Gait instability  R26.81       2. Weakness generalized  R53.1           Dear Dr. Trevizo:    Thank you for your recent referral of Baldomero Camargo. Please review the attached evaluation summary from Baldomero's recent visit.     Please verify that you agree with the plan of care by signing the attached order.     If you have any questions or concerns, please do not hesitate to call.     I sincerely appreciate the opportunity to share in the care of one of your patients and hope to have another opportunity to work with you in the near future.       Sincerely,    Buster Fletcher, PT      Referring Provider:      I certify that I have read the below Plan of Care and certify the need for these services furnished under this plan of treatment while under my care.                    Kolton Trevizo  [address] [facility] [place], [place]. [address] [place] [id number]  Via Fax: [phone]          PT Evaluation     Today's date: 2024  Patient name: Baldomero Camargo  : 1940  MRN: [id number]  Referring provider: Kolton Trevizo  Dx:   Encounter Diagnosis     ICD-10-CM    1. Gait instability  R26.81       2. Weakness generalized  R53.1                  Assessment  Impairments: abnormal gait, abnormal or restricted ROM, activity intolerance, impaired physical strength, lacks appropriate home exercise program, pain with function, weight-bearing intolerance, poor posture  and poor body mechanics  Functional limitations: walk/stand with RW to >10 min, STS, stairs, decrease fall risk    Assessment details: Baldomero Camargo is a 84 y.o. male who presents with pain, decreased strength, decreased ROM, ambulatory dysfunction, and balance dysfunction. Due to these impairments, patient has difficulty performing ADL's, ambulation,  stair negotiation, transfers. Patient's clinical presentation is consistent with their referring diagnosis of Gait instability  (primary encounter diagnosis), Weakness generalized. Patient has been educated in home exercise program and plan of care. Patient would benefit from skilled physical therapy services to address their aforementioned functional limitations and progress towards prior level of function and independence with home exercise program.        Prognosis: poor  Prognosis details: + factors: high motivation levels  - factors: age, stroke, recent R LEANA as result of fall, history of L hip ORIF, history of b/l TKA's    Goals  Short Term Goals to be accomplished by 10/15/24:  STG1: Pt will be I with HEP  STG2: Pt will demo TUG <42 sec to demonstrate improved gait speed.  STG3: Pt will demo 4/5 MMT strength in knee to improve stair negotiation.   STG4: Pt will demonstrate 5x STS with 1 foam with b/l UE assist  <18 sec, to demonstrate improved LE strength.      Long Term Goals to be accomplished by 12/10/24:   LTG1: Pt will be able to perform TUG test <35 sec to demonstrate improved gait speed.  LTG2: Pt will be able to perform 5x STS test with 1 foam w/ b/l UE assist <14 sec to demonstrate improved LE strength.  LTG3: Pt will note subjective report of 50% reduction in fear of falling.       Plan  Patient would benefit from: PT eval and skilled physical therapy  Planned modality interventions: cryotherapy, thermotherapy: hydrocollator packs and unattended electrical stimulation    Planned therapy interventions: manual therapy, neuromuscular re-education, self care, therapeutic activities, therapeutic exercise, home exercise program, patient education, joint mobilization, balance, strengthening, stretching and therapeutic training    Frequency: 2x week  Duration in weeks: 12  Plan of Care beginning date: 9/17/2024  Plan of Care expiration date: 12/10/2024  Treatment plan discussed with: patient and  caregiver  Plan details: HEP development, stretching, strengthening, AROM, posture education, STM/MI as needed to reduce muscle tension, muscle reeducation, PLOC discussed and agreed upon with patient and caregiver (his wife).            Subjective Evaluation    History of Present Illness  Mechanism of injury: Pt is a 83 y/o male who presents to PT with his wife with prescription for gait instability and weakness. Baldomero and his wife are uncertain of most details, however said they were generally confident over the details provided.   They stated that he had a stroke Dec 2023, and went to [facility] inpatient rehab for about 2.5 months at. Where he was using walker at end of that time. They noted that he was walking about from living room to ward way thru kitchen/dining room then back to living room. Unfortunately soon after that he fell and broke R hip . He was in [facility] where they stated a R LEANA was performed. They were uncertain if he went to inpatient rehab, but stated that he was hospitalized about a month. Sounded like from what they were saying that he went to a skilled nursing center for about 1-2 months after that. Stated that after that he had home care PT 3x a week for about a month and a half and completed about 2 weeks ago.   Went to PCP on 24 where he was prescribed PT.   (Occasionally goes up stairs for exercise, however has bathroom and bedroom on first level where he mostly stays)  Medical history: Stroke Dec 2023, history of TKA's b/l, history of L hip fracture prior to stroke, R LEANA after stroke due to fall and fractured hip    Had rails installed in bathtub so he can  bath tub. Has 2 handrails for 3 stairs to enter home.     Patient Goals  Patient goals for therapy: increased strength, improved balance, increased motion and independence with ADLs/IADLs  Patient goal: walk/stand with RW to >10 min, STS, stairs, decrease fall risk  Pain  Current pain ratin  At  best pain ratin  At worst pain ratin  Location: R hip, occasionally in L leg  Quality: tight  Relieving factors: relaxation and rest  Aggravating factors: standing, walking and stair climbing    Social Support  Steps to enter house: yes  3  Stairs in house: yes   15  Lives in: multiple-level home  Lives with: spouse    Treatments  Current treatment: physical therapy        Objective     Strength/Myotome Testing     Left Hip   Planes of Motion   Flexion: 4    Right Hip   Planes of Motion   Flexion: 3-    Left Knee   Flexion: 4  Extension: 4-    Right Knee   Flexion: 4  Extension: 3+    Left Ankle/Foot   Dorsiflexion: 4+  Plantar flexion: 4    Right Ankle/Foot   Dorsiflexion: 4+  Plantar flexion: 4  Neuro Exam:     Functional outcomes   5x sit to stand: 21 sec, 2UE arm rests, 1 foam (seconds)  TU (seconds)  Functional outcome comment: 30 sec STS: NT     5x STS:   0UE assist, 2 foam, only able to perform 4 reps in 30 sec, noted R hip/knee pain    Functional outcome gait comment: Gait: RW, slow step to gait, leads with R, uncoordinated sequencing, signficant difficulty with swing phase with LLE, reports progressive increase in R hip pain during walking and standing with increased time.    BP: 114/78    NBOS: NT         Precautions:   B/l TKA history   Fall: L hip ORIF prior to stroke  Stroke Dec 2023 requiring hospitalization and inpatient rehab  Fall: R hip fracture resulting in R LEANA    Fall Risk      Manuals                                             Neuro Re-Ed         NBOS                                                               Ther Ex         Heel raises/Toe raise Pt reported was in HEP given by home care PT -not reviewed today        Squat hold counter Pt reported was in HEP given by home care PT -not reviewed today        Stand knee flexion Pt reported was in HEP given by home care PT -not reviewed today        Stand hip abduction Pt reported was in HEP given by home care PT -not  "reviewed today        march Pt reported was in HEP given by home care PT -not reviewed today                                   Ther Activity 9/17        STS  2 foam 0UE assist - CG / CS : Not performed         Step up 2\" not performed         Partial squat Not performed                                                                           "

## 2024-09-19 ENCOUNTER — APPOINTMENT (OUTPATIENT)
Dept: PHYSICAL THERAPY | Facility: CLINIC | Age: 84
End: 2024-09-19
Payer: COMMERCIAL

## 2024-09-24 ENCOUNTER — OFFICE VISIT (OUTPATIENT)
Dept: PHYSICAL THERAPY | Facility: CLINIC | Age: 84
End: 2024-09-24
Payer: COMMERCIAL

## 2024-09-24 DIAGNOSIS — R26.81 GAIT INSTABILITY: Primary | ICD-10-CM

## 2024-09-24 DIAGNOSIS — R53.1 WEAKNESS GENERALIZED: ICD-10-CM

## 2024-09-24 PROCEDURE — 97530 THERAPEUTIC ACTIVITIES: CPT | Performed by: PHYSICAL THERAPIST

## 2024-09-24 PROCEDURE — 97112 NEUROMUSCULAR REEDUCATION: CPT | Performed by: PHYSICAL THERAPIST

## 2024-09-24 PROCEDURE — 97110 THERAPEUTIC EXERCISES: CPT | Performed by: PHYSICAL THERAPIST

## 2024-09-24 NOTE — PROGRESS NOTES
"Daily Note     Today's date: 2024  Patient name: Baldomero Camargo  : 1940  MRN: [id number]  Referring provider: Kolton Trevizo  Dx:   Encounter Diagnosis     ICD-10-CM    1. Gait instability  R26.81       2. Weakness generalized  R53.1              Subjective: Pt reported that he is still using urinary catheter, and will have to use it for the foreseeable future. Noted that he has been keeping up with the exercises he has been doing for a while.     Objective: See treatment diary below    Assessment: Tolerated treatment well. Required CS and CG for most exercises due to falls risk. Required v/c for form with most exercises.  Patient demonstrated fatigue post treatment and would benefit from continued PT    Plan: Continue per plan of care.      Precautions:   B/l TKA history   Fall: L hip ORIF prior to stroke  Stroke Dec 2023 requiring hospitalization and inpatient rehab  Fall: R hip fracture resulting in R LEANA    Fall Risk      Manuals                                            Neuro Re-Ed        NBOS  :30x2 CS       NBOS head turns   2x10 ea CS        Tandem  :30x2 ea (L forward harder than R)  CG                                           Ther Ex        Heel raises/Toe raise         Squat hold counter         Stand knee flexion         Stand hip abduction         march         LAQ   10x  2# :10x5 ea       Seated TB clamshell  Peach :10x5        Seated hip adduction  Blue :10x5                 Ther Activity        STS  2 foam 0UE assist - CG / CS : Not performed  2 foam 2UE arm rests  2x10   3 foam no arm rests  10x   CS         Step up 2\" not performed  2\" 2x10 ea CG at RW 2UE assist       Partial squat Not performed                                                           "

## 2024-10-01 ENCOUNTER — APPOINTMENT (OUTPATIENT)
Dept: PHYSICAL THERAPY | Facility: CLINIC | Age: 84
End: 2024-10-01
Payer: COMMERCIAL

## 2024-10-02 ENCOUNTER — OFFICE VISIT (OUTPATIENT)
Dept: PHYSICAL THERAPY | Facility: CLINIC | Age: 84
End: 2024-10-02
Payer: COMMERCIAL

## 2024-10-02 DIAGNOSIS — R53.1 WEAKNESS GENERALIZED: ICD-10-CM

## 2024-10-02 DIAGNOSIS — R26.81 GAIT INSTABILITY: Primary | ICD-10-CM

## 2024-10-02 PROCEDURE — 97530 THERAPEUTIC ACTIVITIES: CPT

## 2024-10-02 PROCEDURE — 97112 NEUROMUSCULAR REEDUCATION: CPT

## 2024-10-02 PROCEDURE — 97110 THERAPEUTIC EXERCISES: CPT

## 2024-10-02 NOTE — PROGRESS NOTES
"Daily Note     Today's date: 10/2/2024  Patient name: Baldomero Camargo  : 1940  MRN: [id number]  Referring provider: Kolton Trevizo  Dx:   Encounter Diagnosis     ICD-10-CM    1. Gait instability  R26.81       2. Weakness generalized  R53.1                Subjective: Ivan reports he has been complaint with his home exercise. Notes \"I would really like to get rid of this walker, but I know I am not ready for that yet\".     Objective: See treatment diary below    Assessment: Ivan tolerated PT treatment well, pt challenged with current exercise program. Was able to complete STS with decreased UE and foam assistance. CS/CGA required with static balance training. Mod A/P sway present with /2 tandem and NBOS head turns. Added seated TB march to diary, fatigues fairly quickly with active hip flexion. Pt would benefit from continued PT to further address impairments and maximize functional level.      Plan: Continue per plan of care.      Precautions:   B/l TKA history   Fall: L hip ORIF prior to stroke  Stroke Dec 2023 requiring hospitalization and inpatient rehab  Fall: R hip fracture resulting in R LEANA    Fall Risk      Manuals 9/17 9/24 10/2                                          Neuro Re-Ed 9/17 9/24 10/2      NBOS  :30x2 CS :30x2       NBOS head turns   2x10 ea CS 2x10 ea        Tandem  :30x2 ea (L forward harder than R)  CG :30x2 ea                                           Ther Ex 9/17 9/24 10/2      Heel raises/Toe raise         Squat hold counter         Stand knee flexion         Stand hip abduction         march   Seated [place] TB 2x10       LAQ   10x  2# :10x5 ea 2# 2x10 ea       Seated TB clamshell  Peach :10x5  Peach TB :05 2x10       Seated hip adduction  Blue :10x5  :05 2x10 blue                Ther Activity 9/17 9/24 10/2       STS  2 foam 0UE assist - CG / CS : Not performed  2 foam 2UE arm rests  2x10   3 foam no arm rests  10x   CS   2 foam no UE 3x10       Step up 2\" not performed  2\" 2x10 ea " CG at RW 2UE assist NV      Partial squat Not performed                                                            Skin normal color for race, warm, dry and intact. No evidence of rash.

## 2024-10-03 ENCOUNTER — OFFICE VISIT (OUTPATIENT)
Dept: PHYSICAL THERAPY | Facility: CLINIC | Age: 84
End: 2024-10-03
Payer: COMMERCIAL

## 2024-10-03 DIAGNOSIS — R53.1 WEAKNESS GENERALIZED: ICD-10-CM

## 2024-10-03 DIAGNOSIS — R26.81 GAIT INSTABILITY: Primary | ICD-10-CM

## 2024-10-03 PROCEDURE — 97530 THERAPEUTIC ACTIVITIES: CPT

## 2024-10-03 PROCEDURE — 97112 NEUROMUSCULAR REEDUCATION: CPT

## 2024-10-03 PROCEDURE — 97110 THERAPEUTIC EXERCISES: CPT

## 2024-10-03 NOTE — PROGRESS NOTES
"Daily Note     Today's date: 10/3/2024  Patient name: Baldomero Camargo  : 1940  MRN: [id number]  Referring provider: Kolton Trevizo  Dx:   Encounter Diagnosis     ICD-10-CM    1. Gait instability  R26.81       2. Weakness generalized  R53.1                Subjective: Ivan reports R hip/knee is a bit sore today, most noticeable with WBing.    Objective: See treatment diary below    Assessment: Ivan tolerated PT treatment well but was a bit more fatigued throughout today's session. Continues to require CS/CGA with static balance training for safety. Tactile cues required with stair navigation to decrease L trunk lean when ascending with RLE. Frequent seated rest breaks required d/t fatigue. Pt would benefit from continued PT to further address impairments and maximize functional level.      Plan: Continue per plan of care.      Precautions:   B/l TKA history   Fall: L hip ORIF prior to stroke  Stroke Dec 2023 requiring hospitalization and inpatient rehab  Fall: R hip fracture resulting in R LEANA    Fall Risk      Manuals 9/17 9/24 10/2 10/3                                         Neuro Re-Ed 9/17 9/24 10/2 10/3     NBOS  :30x2 CS :30x2  :30x2     NBOS head turns   2x10 ea CS 2x10 ea  2x10 ea      Tandem  :30x2 ea (L forward harder than R)  CG :30x2 ea  :                                         Ther Ex 9/17 9/24 10/2 10/3     Heel raises/Toe raise         Squat hold counter         Stand knee flexion         Stand hip abduction         march   Seated [place]ach TB 2x10  Seated [place] TB 2x10      LAQ   10x  2# :10x5 ea 2# 2x10 ea  2# 2x10 ea      Seated TB clamshell  Peach :10x5  Peach TB :05 2x10  Peach TB :05 2x10      Seated hip adduction  Blue :10x5  :05 2x10 blue  :05 2x10 blue               Ther Activity 9/17 9/24 10/2  10/3     STS  2 foam 0UE assist - CG / CS : Not performed  2 foam 2UE arm rests  2x10   3 foam no arm rests  10x   CS   2 foam no UE3 3x10  2 foam no UE3 2x10 x7      Step up 2\" not performed  2\" " "2x10 ea CG at RW 2UE assist NV 2\" x10 ea     Partial squat Not performed                                                           "

## 2024-10-08 ENCOUNTER — OFFICE VISIT (OUTPATIENT)
Dept: PHYSICAL THERAPY | Facility: CLINIC | Age: 84
End: 2024-10-08
Payer: COMMERCIAL

## 2024-10-08 DIAGNOSIS — R26.81 GAIT INSTABILITY: Primary | ICD-10-CM

## 2024-10-08 DIAGNOSIS — R53.1 WEAKNESS GENERALIZED: ICD-10-CM

## 2024-10-08 PROCEDURE — 97110 THERAPEUTIC EXERCISES: CPT

## 2024-10-08 PROCEDURE — 97112 NEUROMUSCULAR REEDUCATION: CPT

## 2024-10-08 PROCEDURE — 97530 THERAPEUTIC ACTIVITIES: CPT

## 2024-10-08 NOTE — PROGRESS NOTES
"Daily Note     Today's date: 10/8/2024  Patient name: Baldomero Camargo  : 1940  MRN: [id number]  Referring provider: Kolton Trevizo  Dx:   Encounter Diagnosis     ICD-10-CM    1. Gait instability  R26.81       2. Weakness generalized  R53.1           Start Time: 0945  Stop Time: 1030  Total time in clinic (min): 45 minutes    Subjective: Ivan states Les tired prior to start of session.    Objective: See treatment diary below    Assessment: Ivan demonstrated good tolerance to TE. Some inc fatigue with STS this session. Patient would benefit from continued PT.    Plan: Continue per plan of care.      Precautions:   B/l TKA history   Fall: L hip ORIF prior to stroke  Stroke Dec 2023 requiring hospitalization and inpatient rehab  Fall: R hip fracture resulting in R LEANA    Fall Risk      Manuals 9/17 9/24 10/2 10/3 10/8                                        Neuro Re-Ed 9/17 9/24 10/2 10/3 10/8    NBOS  :30x2 CS :30x2  :30x2 :30x2    NBOS head turns   2x10 ea CS 2x10 ea  2x10 ea 2x10ea     Tandem  :30x2 ea (L forward harder than R)  CG :30x2 ea  : :30x2 ea                                         Ther Ex 9/17 9/24 10/2 10/3 10/8    Heel raises/Toe raise         Squat hold counter         Stand knee flexion         Stand hip abduction         march   Seated [place]ach TB 2x10  Seated [place] TB 2x10  Seated [place] TB 2x10     LAQ   10x  2# :10x5 ea 2# 2x10 ea  2# 2x10 ea  2# 2x10 ea     Seated TB clamshell  Peach :10x5  Peach TB :05 2x10  Peach TB :05 2x10  Peach TB :05 2x10     Seated hip adduction  Blue :10x5  :05 2x10 blue  :05 2x10 blue  :05 3x10 blue              Ther Activity 9/17 9/24 10/2  10/3 10/8    STS  2 foam 0UE assist - CG / CS : Not performed  2 foam 2UE arm rests  2x10   3 foam no arm rests  10x   CS   2 foam no UE3 3x10  2 foam no UE3 2x10 x7  2 foam 2x10    Step up 2\" not performed  2\" 2x10 ea CG at RW 2UE assist NV 2\" x10 ea 2\" 2x10 ea    Partial squat Not performed                                       "

## 2024-10-10 ENCOUNTER — OFFICE VISIT (OUTPATIENT)
Dept: PHYSICAL THERAPY | Facility: CLINIC | Age: 84
End: 2024-10-10
Payer: COMMERCIAL

## 2024-10-10 ENCOUNTER — HOSPITAL ENCOUNTER (OUTPATIENT)
Dept: HOSPITAL 99 - 4 EAST ACU | Age: 84
Setting detail: OBSERVATION
LOS: 1 days | Discharge: HOME HEALTH | End: 2024-10-11
Payer: COMMERCIAL

## 2024-10-10 VITALS — RESPIRATION RATE: 15 BRPM

## 2024-10-10 VITALS — RESPIRATION RATE: 19 BRPM | SYSTOLIC BLOOD PRESSURE: 154 MMHG | DIASTOLIC BLOOD PRESSURE: 106 MMHG

## 2024-10-10 VITALS — SYSTOLIC BLOOD PRESSURE: 156 MMHG | DIASTOLIC BLOOD PRESSURE: 78 MMHG | RESPIRATION RATE: 16 BRPM

## 2024-10-10 VITALS — DIASTOLIC BLOOD PRESSURE: 89 MMHG | RESPIRATION RATE: 16 BRPM | SYSTOLIC BLOOD PRESSURE: 142 MMHG

## 2024-10-10 VITALS — SYSTOLIC BLOOD PRESSURE: 136 MMHG | DIASTOLIC BLOOD PRESSURE: 88 MMHG | RESPIRATION RATE: 21 BRPM

## 2024-10-10 VITALS — DIASTOLIC BLOOD PRESSURE: 72 MMHG | SYSTOLIC BLOOD PRESSURE: 129 MMHG | RESPIRATION RATE: 18 BRPM

## 2024-10-10 VITALS — SYSTOLIC BLOOD PRESSURE: 135 MMHG | DIASTOLIC BLOOD PRESSURE: 86 MMHG | RESPIRATION RATE: 13 BRPM

## 2024-10-10 VITALS — DIASTOLIC BLOOD PRESSURE: 97 MMHG | RESPIRATION RATE: 18 BRPM | SYSTOLIC BLOOD PRESSURE: 159 MMHG

## 2024-10-10 VITALS — DIASTOLIC BLOOD PRESSURE: 84 MMHG | RESPIRATION RATE: 18 BRPM | SYSTOLIC BLOOD PRESSURE: 143 MMHG

## 2024-10-10 VITALS — RESPIRATION RATE: 12 BRPM | SYSTOLIC BLOOD PRESSURE: 152 MMHG | DIASTOLIC BLOOD PRESSURE: 90 MMHG

## 2024-10-10 VITALS — RESPIRATION RATE: 14 BRPM | SYSTOLIC BLOOD PRESSURE: 153 MMHG | DIASTOLIC BLOOD PRESSURE: 99 MMHG

## 2024-10-10 VITALS — DIASTOLIC BLOOD PRESSURE: 80 MMHG | SYSTOLIC BLOOD PRESSURE: 133 MMHG | RESPIRATION RATE: 18 BRPM

## 2024-10-10 VITALS — SYSTOLIC BLOOD PRESSURE: 143 MMHG | RESPIRATION RATE: 13 BRPM | DIASTOLIC BLOOD PRESSURE: 80 MMHG

## 2024-10-10 VITALS — SYSTOLIC BLOOD PRESSURE: 141 MMHG | DIASTOLIC BLOOD PRESSURE: 88 MMHG | RESPIRATION RATE: 16 BRPM

## 2024-10-10 VITALS — RESPIRATION RATE: 13 BRPM | SYSTOLIC BLOOD PRESSURE: 142 MMHG | DIASTOLIC BLOOD PRESSURE: 85 MMHG

## 2024-10-10 VITALS — SYSTOLIC BLOOD PRESSURE: 144 MMHG | DIASTOLIC BLOOD PRESSURE: 85 MMHG | RESPIRATION RATE: 16 BRPM

## 2024-10-10 VITALS — SYSTOLIC BLOOD PRESSURE: 123 MMHG | RESPIRATION RATE: 10 BRPM | DIASTOLIC BLOOD PRESSURE: 89 MMHG

## 2024-10-10 VITALS — DIASTOLIC BLOOD PRESSURE: 87 MMHG | SYSTOLIC BLOOD PRESSURE: 167 MMHG | RESPIRATION RATE: 15 BRPM

## 2024-10-10 VITALS — RESPIRATION RATE: 13 BRPM | DIASTOLIC BLOOD PRESSURE: 81 MMHG | SYSTOLIC BLOOD PRESSURE: 143 MMHG

## 2024-10-10 VITALS — DIASTOLIC BLOOD PRESSURE: 88 MMHG | SYSTOLIC BLOOD PRESSURE: 142 MMHG | RESPIRATION RATE: 16 BRPM

## 2024-10-10 VITALS — SYSTOLIC BLOOD PRESSURE: 155 MMHG | DIASTOLIC BLOOD PRESSURE: 94 MMHG | RESPIRATION RATE: 22 BRPM

## 2024-10-10 VITALS — RESPIRATION RATE: 16 BRPM | DIASTOLIC BLOOD PRESSURE: 95 MMHG | SYSTOLIC BLOOD PRESSURE: 155 MMHG

## 2024-10-10 VITALS — SYSTOLIC BLOOD PRESSURE: 149 MMHG | RESPIRATION RATE: 14 BRPM | DIASTOLIC BLOOD PRESSURE: 112 MMHG

## 2024-10-10 VITALS — RESPIRATION RATE: 15 BRPM | DIASTOLIC BLOOD PRESSURE: 86 MMHG | SYSTOLIC BLOOD PRESSURE: 147 MMHG

## 2024-10-10 VITALS — SYSTOLIC BLOOD PRESSURE: 143 MMHG | RESPIRATION RATE: 15 BRPM | DIASTOLIC BLOOD PRESSURE: 84 MMHG

## 2024-10-10 VITALS — BODY MASS INDEX: 21.8 KG/M2 | BODY MASS INDEX: 21.9 KG/M2

## 2024-10-10 VITALS — RESPIRATION RATE: 15 BRPM | DIASTOLIC BLOOD PRESSURE: 84 MMHG | SYSTOLIC BLOOD PRESSURE: 136 MMHG

## 2024-10-10 VITALS — DIASTOLIC BLOOD PRESSURE: 89 MMHG | RESPIRATION RATE: 16 BRPM | SYSTOLIC BLOOD PRESSURE: 125 MMHG

## 2024-10-10 VITALS — RESPIRATION RATE: 11 BRPM

## 2024-10-10 VITALS — RESPIRATION RATE: 17 BRPM

## 2024-10-10 VITALS — RESPIRATION RATE: 13 BRPM

## 2024-10-10 VITALS — SYSTOLIC BLOOD PRESSURE: 155 MMHG | DIASTOLIC BLOOD PRESSURE: 101 MMHG

## 2024-10-10 VITALS — SYSTOLIC BLOOD PRESSURE: 150 MMHG | DIASTOLIC BLOOD PRESSURE: 87 MMHG

## 2024-10-10 DIAGNOSIS — R47.1: ICD-10-CM

## 2024-10-10 DIAGNOSIS — R26.2: ICD-10-CM

## 2024-10-10 DIAGNOSIS — R27.8: ICD-10-CM

## 2024-10-10 DIAGNOSIS — Z79.02: ICD-10-CM

## 2024-10-10 DIAGNOSIS — E85.4: Primary | ICD-10-CM

## 2024-10-10 DIAGNOSIS — H91.90: ICD-10-CM

## 2024-10-10 DIAGNOSIS — Z96.641: ICD-10-CM

## 2024-10-10 DIAGNOSIS — J34.89: ICD-10-CM

## 2024-10-10 DIAGNOSIS — G93.40: ICD-10-CM

## 2024-10-10 DIAGNOSIS — R26.81 GAIT INSTABILITY: Primary | ICD-10-CM

## 2024-10-10 DIAGNOSIS — R13.10: ICD-10-CM

## 2024-10-10 DIAGNOSIS — Z87.891: ICD-10-CM

## 2024-10-10 DIAGNOSIS — R29.810: ICD-10-CM

## 2024-10-10 DIAGNOSIS — G31.9: ICD-10-CM

## 2024-10-10 DIAGNOSIS — R53.1 WEAKNESS GENERALIZED: ICD-10-CM

## 2024-10-10 DIAGNOSIS — R33.8: ICD-10-CM

## 2024-10-10 DIAGNOSIS — I10: ICD-10-CM

## 2024-10-10 DIAGNOSIS — I70.0: ICD-10-CM

## 2024-10-10 DIAGNOSIS — I65.23: ICD-10-CM

## 2024-10-10 DIAGNOSIS — Q27.9: ICD-10-CM

## 2024-10-10 DIAGNOSIS — R41.89: ICD-10-CM

## 2024-10-10 DIAGNOSIS — H35.30: ICD-10-CM

## 2024-10-10 DIAGNOSIS — I68.0: ICD-10-CM

## 2024-10-10 DIAGNOSIS — Z87.19: ICD-10-CM

## 2024-10-10 DIAGNOSIS — R53.1: ICD-10-CM

## 2024-10-10 DIAGNOSIS — K21.9: ICD-10-CM

## 2024-10-10 DIAGNOSIS — M19.90: ICD-10-CM

## 2024-10-10 DIAGNOSIS — Z79.890: ICD-10-CM

## 2024-10-10 DIAGNOSIS — Z80.42: ICD-10-CM

## 2024-10-10 DIAGNOSIS — Z82.49: ICD-10-CM

## 2024-10-10 DIAGNOSIS — E03.9: ICD-10-CM

## 2024-10-10 DIAGNOSIS — Z86.73: ICD-10-CM

## 2024-10-10 DIAGNOSIS — N40.1: ICD-10-CM

## 2024-10-10 DIAGNOSIS — Z79.82: ICD-10-CM

## 2024-10-10 DIAGNOSIS — Z87.11: ICD-10-CM

## 2024-10-10 DIAGNOSIS — E78.00: ICD-10-CM

## 2024-10-10 LAB
ALBUMIN SERPL-MCNC: 4.2 G/DL (ref 3.5–5)
ALP SERPL-CCNC: 89 U/L (ref 38–126)
ALT SERPL-CCNC: 29 U/L (ref 0–50)
APTT PPP: 29 SEC (ref 23.4–35)
AST SERPL-CCNC: 40 U/L (ref 17–59)
BUN SERPL-MCNC: 15 MG/DL (ref 9–20)
CALCIUM SERPL-MCNC: 10.2 MG/DL (ref 8.4–10.2)
CHLORIDE SERPL-SCNC: 103 MMOL/L (ref 98–107)
CO2 SERPL-SCNC: 31 MMOL/L (ref 22–30)
EGFR: > 60
ERYTHROCYTE [DISTWIDTH] IN BLOOD BY AUTOMATED COUNT: 13.7 % (ref 11.5–14.5)
ESTIMATED CREATININE CLEARANCE: 64 ML/MIN
GLUCOSE - POINT OF CARE: 107 MG/DL (ref 70–99)
GLUCOSE SERPL-MCNC: 92 MG/DL (ref 70–99)
HCT VFR BLD AUTO: 38 % (ref 39–52)
HGB BLD-MCNC: 13.1 G/DL (ref 13–18)
INR PPP: 1.04
MAGNESIUM SERPL-MCNC: 1.5 MG/DL (ref 1.6–2.3)
MCHC RBC AUTO-ENTMCNC: 34.5 G/DL (ref 33–37)
MCV RBC AUTO: 87.2 FL (ref 80–94)
NRBC BLD AUTO-RTO: 0 %
PLATELET # BLD AUTO: 165 10^3/UL (ref 130–400)
POTASSIUM SERPL-SCNC: 4.4 MMOL/L (ref 3.5–5.1)
PROT SERPL-MCNC: 6.7 G/DL (ref 6.3–8.2)
PROTHROMBIN TIME: 13.6 SEC (ref 11.4–14.6)
SODIUM SERPL-SCNC: 141 MMOL/L (ref 135–145)
TROPONIN I SERPL-MCNC: < 0.012 NG/ML
VIT B12 SERPL-MCNC: 842 PG/ML (ref 239–931)

## 2024-10-10 PROCEDURE — G0378 HOSPITAL OBSERVATION PER HR: HCPCS

## 2024-10-10 PROCEDURE — 97110 THERAPEUTIC EXERCISES: CPT

## 2024-10-10 RX ADMIN — FINASTERIDE 5 MG: 5 TABLET, FILM COATED ORAL at 18:01

## 2024-10-10 RX ADMIN — Medication 1 CAP: at 21:44

## 2024-10-10 RX ADMIN — SENNOSIDES 17.2 MG: 8.6 TABLET ORAL at 21:44

## 2024-10-10 RX ADMIN — ENOXAPARIN SODIUM 40 MG: 40 INJECTION SUBCUTANEOUS at 18:01

## 2024-10-10 RX ADMIN — ATORVASTATIN CALCIUM 40 MG: 40 TABLET, FILM COATED ORAL at 18:01

## 2024-10-10 NOTE — PROGRESS NOTES
Daily Note     Today's date: 10/10/2024  Patient name: Baldomero Camargo  : 1940  MRN: [id number]  Referring provider: Kolton Trevizo  Dx:   Encounter Diagnosis     ICD-10-CM    1. Gait instability  R26.81       2. Weakness generalized  R53.1           Start Time: 0900  Stop Time: 0930  Total time in clinic (min): 30 minutes    Subjective: Ivan reports he is having a hard time forming sentences today, denies dizziness or lightheadedness. Notes symptoms present since early this morning, expresses frustration.     Objective: See treatment diary below    Assessment: Upon arrival when asked subjective report, pt displaying significant challenge expressing how he is feeling and generally forming sentences. He also displayed great challenge with movement sequencing from sit/stand and transfer from chair to wheel chair, mod/max A required from therapist. Vitals taken, all normal limits. Pt was taken to car in wheel chair to avoid aerobic demand of walking with RW to his car. No changes with severity of verbal challenge.  PTA and PT suggested pt and his spouse to go to ER, held on PT today.     /85  Sp02 99 %  HR 84     Plan: Continue per plan of care. Follow up after ER visit.      Precautions:   B/l TKA history   Fall: L hip ORIF prior to stroke  Stroke Dec 2023 requiring hospitalization and inpatient rehab  Fall: R hip fracture resulting in R LEANA    Fall Risk      Manuals 9/17 9/24 10/2 10/3 10/8 10/10                                       Neuro Re-Ed 9/17 9/24 10/2 10/3 10/8    NBOS  :30x2 CS :30x2  :30x2 :30x2    NBOS head turns   2x10 ea CS 2x10 ea  2x10 ea 2x10ea     Tandem  :30x2 ea (L forward harder than R)  CG :30x2 ea  : :30x2 ea                                         Ther Ex 9/17 9/24 10/2 10/3 10/8 10/10   Heel raises/Toe raise         Squat hold counter         Stand knee flexion         Stand hip abduction         march   Seated Peach TB 2x10  Seated Peach TB 2x10  Seated Peach TB 2x10     LAQ   " 10x  2# :10x5 ea 2# 2x10 ea  2# 2x10 ea  2# 2x10 ea     Seated TB clamshell  Peach :10x5  Peach TB :05 2x10  Peach TB :05 2x10  Peach TB :05 2x10     Seated hip adduction  Blue :10x5  :05 2x10 blue  :05 2x10 blue  :05 3x10 blue              Ther Activity 9/17 9/24 10/2  10/3 10/8 10/10   STS  2 foam 0UE assist - CG / CS : Not performed  2 foam 2UE arm rests  2x10   3 foam no arm rests  10x   CS   2 foam no UE3 3x10  2 foam no UE3 2x10 x7  2 foam 2x10    Step up 2\" not performed  2\" 2x10 ea CG at RW 2UE assist NV 2\" x10 ea 2\" 2x10 ea    Partial squat Not performed                                                           "

## 2024-10-11 VITALS — SYSTOLIC BLOOD PRESSURE: 145 MMHG | DIASTOLIC BLOOD PRESSURE: 85 MMHG | HEART RATE: 71 BPM

## 2024-10-11 VITALS — DIASTOLIC BLOOD PRESSURE: 82 MMHG | RESPIRATION RATE: 18 BRPM | SYSTOLIC BLOOD PRESSURE: 140 MMHG

## 2024-10-11 VITALS — DIASTOLIC BLOOD PRESSURE: 85 MMHG | SYSTOLIC BLOOD PRESSURE: 145 MMHG | HEART RATE: 71 BPM | OXYGEN SATURATION: 96 %

## 2024-10-11 VITALS — SYSTOLIC BLOOD PRESSURE: 128 MMHG | DIASTOLIC BLOOD PRESSURE: 70 MMHG | RESPIRATION RATE: 18 BRPM

## 2024-10-11 VITALS — RESPIRATION RATE: 18 BRPM | DIASTOLIC BLOOD PRESSURE: 82 MMHG | SYSTOLIC BLOOD PRESSURE: 124 MMHG

## 2024-10-11 VITALS — DIASTOLIC BLOOD PRESSURE: 81 MMHG | RESPIRATION RATE: 14 BRPM | SYSTOLIC BLOOD PRESSURE: 122 MMHG

## 2024-10-11 LAB
BUN SERPL-MCNC: 13 MG/DL (ref 9–20)
CALCIUM SERPL-MCNC: 10.1 MG/DL (ref 8.4–10.2)
CHLORIDE SERPL-SCNC: 103 MMOL/L (ref 98–107)
CO2 SERPL-SCNC: 22 MMOL/L (ref 22–30)
EGFR: > 60
ERYTHROCYTE [DISTWIDTH] IN BLOOD BY AUTOMATED COUNT: 13.7 % (ref 11.5–14.5)
ESTIMATED CREATININE CLEARANCE: 71 ML/MIN
GLUCOSE SERPL-MCNC: 107 MG/DL (ref 70–99)
HCT VFR BLD AUTO: 36.6 % (ref 39–52)
HDLC SERPL-MCNC: 68 MG/DL
HGB BLD-MCNC: 12.6 G/DL (ref 13–18)
LDLC SERPL CALC-MCNC: 62 MG/DL
MCHC RBC AUTO-ENTMCNC: 34.4 G/DL (ref 33–37)
MCV RBC AUTO: 86.7 FL (ref 80–94)
NRBC BLD AUTO-RTO: 0 %
PLATELET # BLD AUTO: 153 10^3/UL (ref 130–400)
POTASSIUM SERPL-SCNC: 3.9 MMOL/L (ref 3.5–5.1)
SODIUM SERPL-SCNC: 140 MMOL/L (ref 135–145)
VLDLC SERPL CALC-MCNC: 24 MG/DL (ref 0–30)

## 2024-10-11 RX ADMIN — Medication 25 MCG: at 08:14

## 2024-10-11 RX ADMIN — MAGNESIUM SULFATE HEPTAHYDRATE 50: 2 INJECTION, SOLUTION INTRAVENOUS at 08:13

## 2024-10-11 RX ADMIN — FINASTERIDE 5 MG: 5 TABLET, FILM COATED ORAL at 17:33

## 2024-10-11 RX ADMIN — ENOXAPARIN SODIUM: 40 INJECTION SUBCUTANEOUS at 17:33

## 2024-10-11 RX ADMIN — CLOPIDOGREL BISULFATE 75 MG: 75 TABLET ORAL at 08:14

## 2024-10-11 RX ADMIN — LEVOTHYROXINE SODIUM 100 MCG: 0.1 TABLET ORAL at 05:29

## 2024-10-11 RX ADMIN — CYANOCOBALAMIN TAB 1000 MCG 1000 MCG: 1000 TAB at 08:14

## 2024-10-11 RX ADMIN — Medication 1 CAP: at 08:14

## 2024-10-11 RX ADMIN — PYRIDOXINE HCL TAB 50 MG 100 MG: 50 TAB at 08:14

## 2024-10-11 RX ADMIN — ATORVASTATIN CALCIUM 40 MG: 40 TABLET, FILM COATED ORAL at 17:33

## 2024-10-11 RX ADMIN — PANTOPRAZOLE SODIUM 40 MG: 40 TABLET, DELAYED RELEASE ORAL at 08:14

## 2024-10-11 RX ADMIN — ASPIRIN 81 MG: 81 TABLET, CHEWABLE ORAL at 08:14

## 2024-10-12 ENCOUNTER — HOSPITAL ENCOUNTER (EMERGENCY)
Dept: HOSPITAL 99 - EMR | Age: 84
Discharge: HOME | End: 2024-10-12
Payer: COMMERCIAL

## 2024-10-12 VITALS — RESPIRATION RATE: 18 BRPM | SYSTOLIC BLOOD PRESSURE: 136 MMHG | DIASTOLIC BLOOD PRESSURE: 82 MMHG

## 2024-10-12 DIAGNOSIS — T83.021A: Primary | ICD-10-CM
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
## 2024-10-12 DIAGNOSIS — Z87.891: ICD-10-CM
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
## 2024-10-12 PROCEDURE — 51702 INSERT TEMP BLADDER CATH: CPT

## 2024-10-12 PROCEDURE — 99283 EMERGENCY DEPT VISIT LOW MDM: CPT

## 2024-10-15 ENCOUNTER — APPOINTMENT (OUTPATIENT)
Dept: PHYSICAL THERAPY | Facility: CLINIC | Age: 84
End: 2024-10-15
Payer: COMMERCIAL

## 2024-10-17 ENCOUNTER — APPOINTMENT (OUTPATIENT)
Dept: PHYSICAL THERAPY | Facility: CLINIC | Age: 84
End: 2024-10-17
Payer: COMMERCIAL

## 2024-10-22 ENCOUNTER — APPOINTMENT (OUTPATIENT)
Dept: PHYSICAL THERAPY | Facility: CLINIC | Age: 84
End: 2024-10-22
Payer: COMMERCIAL

## 2024-10-23 ENCOUNTER — HOSPITAL ENCOUNTER (EMERGENCY)
Dept: HOSPITAL 99 - EMR | Age: 84
Discharge: HOME | End: 2024-10-23
Payer: COMMERCIAL

## 2024-10-23 VITALS — RESPIRATION RATE: 18 BRPM | DIASTOLIC BLOOD PRESSURE: 85 MMHG | SYSTOLIC BLOOD PRESSURE: 127 MMHG

## 2024-10-23 VITALS — BODY MASS INDEX: 22.8 KG/M2

## 2024-10-23 VITALS — SYSTOLIC BLOOD PRESSURE: 135 MMHG | DIASTOLIC BLOOD PRESSURE: 79 MMHG

## 2024-10-23 DIAGNOSIS — K59.09: Primary | ICD-10-CM

## 2024-10-23 DIAGNOSIS — Z87.891: ICD-10-CM

## 2024-10-23 PROCEDURE — 99283 EMERGENCY DEPT VISIT LOW MDM: CPT

## 2024-10-23 RX ADMIN — MAGNESIUM CITRATE 300 ML: 1.75 LIQUID ORAL at 18:08

## 2024-10-24 ENCOUNTER — APPOINTMENT (OUTPATIENT)
Dept: PHYSICAL THERAPY | Facility: CLINIC | Age: 84
End: 2024-10-24
Payer: COMMERCIAL

## 2024-10-29 ENCOUNTER — APPOINTMENT (OUTPATIENT)
Dept: PHYSICAL THERAPY | Facility: CLINIC | Age: 84
End: 2024-10-29
Payer: COMMERCIAL

## 2024-10-31 ENCOUNTER — APPOINTMENT (OUTPATIENT)
Dept: PHYSICAL THERAPY | Facility: CLINIC | Age: 84
End: 2024-10-31
Payer: COMMERCIAL

## 2025-01-15 ENCOUNTER — HOSPITAL ENCOUNTER (EMERGENCY)
Dept: HOSPITAL 99 - EMR | Age: 85
Discharge: HOME | End: 2025-01-15
Payer: COMMERCIAL

## 2025-01-15 VITALS — RESPIRATION RATE: 16 BRPM | DIASTOLIC BLOOD PRESSURE: 110 MMHG | SYSTOLIC BLOOD PRESSURE: 184 MMHG

## 2025-01-15 DIAGNOSIS — E03.9: ICD-10-CM

## 2025-01-15 DIAGNOSIS — E78.00: ICD-10-CM

## 2025-01-15 DIAGNOSIS — R33.9: Primary | ICD-10-CM

## 2025-01-15 DIAGNOSIS — Z87.891: ICD-10-CM

## 2025-01-15 DIAGNOSIS — Z82.49: ICD-10-CM

## 2025-01-15 DIAGNOSIS — Z86.73: ICD-10-CM

## 2025-01-15 DIAGNOSIS — I10: ICD-10-CM

## 2025-01-15 LAB
SQUAMOUS URNS QL MICRO: (no result) /LPF
URINE RED BLOOD CELL: (no result) /HPF (ref 0–2)
URINE WHITE CELL: (no result) /HPF (ref 0–5)

## 2025-01-15 PROCEDURE — 51702 INSERT TEMP BLADDER CATH: CPT

## 2025-01-15 PROCEDURE — 99282 EMERGENCY DEPT VISIT SF MDM: CPT

## 2025-03-16 ENCOUNTER — HOSPITAL ENCOUNTER (EMERGENCY)
Dept: HOSPITAL 99 - EMR | Age: 85
Discharge: HOME | End: 2025-03-16
Payer: COMMERCIAL

## 2025-03-16 VITALS — DIASTOLIC BLOOD PRESSURE: 84 MMHG | SYSTOLIC BLOOD PRESSURE: 124 MMHG

## 2025-03-16 VITALS — RESPIRATION RATE: 17 BRPM | SYSTOLIC BLOOD PRESSURE: 135 MMHG | DIASTOLIC BLOOD PRESSURE: 79 MMHG

## 2025-03-16 DIAGNOSIS — E03.9: ICD-10-CM

## 2025-03-16 DIAGNOSIS — Z86.73: ICD-10-CM

## 2025-03-16 DIAGNOSIS — K56.41: Primary | ICD-10-CM

## 2025-03-16 DIAGNOSIS — Z79.02: ICD-10-CM

## 2025-03-16 DIAGNOSIS — R53.1: ICD-10-CM

## 2025-03-16 DIAGNOSIS — E78.00: ICD-10-CM

## 2025-03-16 DIAGNOSIS — I10: ICD-10-CM

## 2025-03-16 LAB
ALBUMIN SERPL-MCNC: 4.6 G/DL (ref 3.5–5)
ALP SERPL-CCNC: 94 U/L (ref 38–126)
ALT SERPL-CCNC: 28 U/L (ref 0–50)
AST SERPL-CCNC: 32 U/L (ref 17–59)
BUN SERPL-MCNC: 16 MG/DL (ref 9–20)
CALCIUM SERPL-MCNC: 10.4 MG/DL (ref 8.4–10.2)
CHLORIDE SERPL-SCNC: 101 MMOL/L (ref 98–107)
CO2 SERPL-SCNC: 26 MMOL/L (ref 22–30)
EGFR: > 60
ERYTHROCYTE [DISTWIDTH] IN BLOOD BY AUTOMATED COUNT: 13.7 % (ref 11.5–14.5)
GLUCOSE SERPL-MCNC: 119 MG/DL (ref 70–99)
HCT VFR BLD AUTO: 38 % (ref 39–52)
HGB BLD-MCNC: 13.4 G/DL (ref 13–18)
INR PPP: 0.95
MCHC RBC AUTO-ENTMCNC: 35.3 G/DL (ref 33–37)
MCV RBC AUTO: 87.4 FL (ref 80–94)
NRBC BLD AUTO-RTO: 0 %
PLATELET # BLD AUTO: 164 10^3/UL (ref 130–400)
POTASSIUM SERPL-SCNC: 4.4 MMOL/L (ref 3.5–5.1)
PROT SERPL-MCNC: 7 G/DL (ref 6.3–8.2)
PROTHROMBIN TIME: 13 SEC (ref 11.4–14.6)
SODIUM SERPL-SCNC: 137 MMOL/L (ref 135–145)
TROPONIN I SERPL-MCNC: < 0.012 NG/ML

## 2025-03-16 PROCEDURE — 99283 EMERGENCY DEPT VISIT LOW MDM: CPT

## 2025-05-02 ENCOUNTER — EVALUATION (OUTPATIENT)
Dept: PHYSICAL THERAPY | Facility: CLINIC | Age: 85
End: 2025-05-02
Payer: COMMERCIAL

## 2025-05-02 DIAGNOSIS — R53.1 GENERALIZED WEAKNESS: ICD-10-CM

## 2025-05-02 DIAGNOSIS — R26.81 GAIT INSTABILITY: Primary | ICD-10-CM

## 2025-05-02 PROCEDURE — 97162 PT EVAL MOD COMPLEX 30 MIN: CPT

## 2025-05-02 PROCEDURE — 97110 THERAPEUTIC EXERCISES: CPT

## 2025-05-02 NOTE — HOME EXERCISE EDUCATION
Program_ID:[id number]   Access Code: [email or web address]  URL: [email or web address]  Date: 05-  Prepared By: Lola Delcid    Program Notes      Exercises      - Seated Shoulder Shrugs - 1 x daily - 7 x weekly - 1-3 sets - 10 reps      - Seated Shoulder Shrug Circles AROM Backward - 1 x daily - 7 x weekly - 1-3 sets - 10 reps      - Seated Scapular Retraction - 1 x daily - 7 x weekly - 1-3 sets - 10 reps      - Seated March - 1 x daily - 7 x weekly - 1-3 sets - 10 reps      - Seated Long Arc Quad - 1 x daily - 7 x weekly - 1-3 sets - 10 reps      - Seated Heel Raise - 1 x daily - 7 x weekly - 1-3 sets - 10 reps      - Seated Toe Raise - 1 x daily - 7 x weekly - 1-3 sets - 10 reps

## 2025-05-02 NOTE — LETTER
May 2, 2025    Kolton Trevizo  [address] [facility] [place] [id number]    Patient: Baldomero Camargo   YOB: 1940   Date of Visit: 2025     Encounter Diagnosis     ICD-10-CM    1. Gait instability  R26.81       2. Generalized weakness  R53.1           Dear Dr. Kolton Trevizo:    Thank you for your recent referral of Baldomero Camargo. Please review the attached evaluation summary from Baldomero's recent visit.     Please verify that you agree with the plan of care by signing the attached order.     If you have any questions or concerns, please do not hesitate to call.     I sincerely appreciate the opportunity to share in the care of one of your patients and hope to have another opportunity to work with you in the near future.       Sincerely,    Lola Delcid, PT      Referring Provider:      I certify that I have read the below Plan of Care and certify the need for these services furnished under this plan of treatment while under my care.                    Kolton Trevizo  [address] [facility] [address] [place] [id number]  Via Fax: [phone]          PT Evaluation     Today's date: 2025  Patient name: Baldomero Camargo  : 1940  MRN: [id number]  Referring provider: Kolton Trevizo  Dx:   Encounter Diagnosis     ICD-10-CM    1. Gait instability  R26.81       2. Generalized weakness  R53.1           Start Time: 0820  Stop Time: 0915  Total time in clinic (min): 55 minutes    Assessment  Impairments: abnormal coordination, abnormal gait, abnormal muscle firing, abnormal muscle tone, abnormal or restricted ROM, abnormal movement, activity intolerance, impaired balance, impaired physical strength, lacks appropriate home exercise program, pain with function, safety issue, weight-bearing intolerance, poor posture , participation limitations, activity limitations and endurance    Assessment details: Patient is a 85 y.o. male presenting to initial examination with chief diagnosis  of gait dysfunction and generalized weakness. Signs and symptoms are consistent with referring diagnoses following stroke. Primary impairments include impaired ROM, flexibility, mobility, strength, and gait/balance. As a result of impairments patient experiences limitations with functional/daily activities including ambulation, transfers, and household chores/ADLs. Educated patient regarding plan of care and answered all patient questions to patient satisfaction. Patient would benefit from skilled PT interventions to address above impairments in order to maximize functional capacity. Thank you for the referral.    Understanding of Dx/Px/POC: good     Prognosis: good    Goals  Impairment Goals: 4-6 weeks  - Patient to decrease pain by 1-4 levels to 0-2/10 at worst for improved activity tolerance.   - Patient to have improved overall strength by 1/2 - 1 grade to at least 4+/5 or WFL t/o for improved ease with ADLs/gait.   - Patient to have improved overall mobility/ROM/flexibility by at least 25-50% to WFL t/o for improved overall functional mobility.     Functional Goals: by discharge  - Patient to discharge to independent [facility].  - Patient to increase FOTO to at least 65 for improved overall functional mobility.   - Patient to have improved ease with transfers/steps.   - Patient to have improved ease with prolonged ambulation.  - Patient to have improved endurance with ambulation safely with FWW.       Plan  Patient would benefit from: skilled physical therapy  Referral necessary: Yes  Planned modality interventions: cryotherapy and thermotherapy: hydrocollator packs    Planned therapy interventions: joint mobilization, manual therapy, massage, Zavala taping, motor coordination training, neuromuscular re-education, orthotic fitting/training, patient education, postural training, strengthening, stretching, therapeutic activities, therapeutic exercise, therapeutic training, flexibility, functional ROM exercises, gait  training, home exercise program, body mechanics training, behavior modification, balance/weight bearing training, balance, activity modification, abdominal trunk stabilization and IASTM    Frequency: 2-3x week  Duration in weeks: 10  Plan of Care beginning date: 5/2/2025  Plan of Care expiration date: 7/11/2025  Treatment plan discussed with: patient      Subjective Evaluation    History of Present Illness  Mechanism of injury: Pt present at  with wife Janelle. Poor/fair historian due to memory difficulties since storke. Lives in 2 story house but everything is accessible on first level. 3 ALE from garage. Driveway is stone.     Retired .     Pt presents to PT to resume/pick off where he left off of from his last appointment in October 2024. Pt was last seen in October and at his last visit, pt and pt's wife was instructed to take pt to hospital due to having symptoms. Pt ended up being hospitalized then. Pt has catheter long term.     Difficulties/limitations: weakness/fatigue, prolonged walking, transfers, household chores/ADLs; is able to dress himself mostly; RLE is a lot more stiff    Pt notes he has had trouble difficulty with swallowing (did have speech therapy in the past but hasn't had any recently). Instructed pt and pt's wife to notify doctor of this.     Denies any pain. Just mainly weakness in his BLE's. However, will have occasional achiness in his B/L hips.     Copied from PT on 9/17/24:  Mechanism of injury: Pt is a 85 y/o male who presents to PT with his wife with prescription for gait instability and weakness. Baldomero and his wife are uncertain of most details, however said they were generally confident over the details provided.   They stated that he had a stroke Dec 2023, and went to [facility] inpatient rehab for about 2.5 months at. Where he was using walker at end of that time. They noted that he was walking about from living room to ward way thru kitchen/dining room then back to  "living room. Unfortunately soon after that he fell and broke R hip March/April. He was in [facility] where they stated a R LEANA was performed. They were uncertain if he went to inpatient rehab, but stated that he was hospitalized about a month. Sounded like from what they were saying that he went to a skilled nursing center for about 1-2 months after that. Stated that after that he had home care PT 3x a week for about a month and a half and completed about 2 weeks ago.   Went to PCP on 9/4/24 where he was prescribed PT.   (Occasionally goes up stairs for exercise, however has bathroom and bedroom on first level where he mostly stays)  Medical history: Stroke Dec 2023, history of TKA's b/l, history of L hip fracture prior to stroke, R LEANA after stroke due to fall and fractured hip     Had rails installed in bathtub so he can  bath tub. Has 2 handrails for 3 stairs to enter home.     Patient Goals  Patient goals for therapy: increased strength, improved balance, increased motion, independence with ADLs/IADLs and decreased pain    Pain  Quality: dull ache        Objective     General Comments:      Lumbar Comments  BLE's grossly 4-/5 t/o.     B/L hip flexor/knee contractions noted.     Forward head posture/rounded shoulder posture.    Has hard time with transfers. Short step/stride length with mild shuffling.                     Precautions: h/o stroke, h/o multiple falls, fall risk, generalized weakness, poor memory, hard of hearing, h/o B/L hip replacement/surgeries      Date 5/2            Auth status:  No auth , 4 units per visit               Visits remaining until re-eval (re-eval every 10 visits) 9                                      Manuals             Gentle hamstring/gastra/hip flexor stretching                                                    Neuro Re-Ed             Sidestepping at bar with upright posture             Alt step (4\") taps with at least 1 HHA at bar                        " "  WBOS vs NBOS with EO                           Shld flexion wall slides to promote upright posture                          Ther Ex             Bike (if able)              Seated marches             Seated heel/toe raises progressing to standing when able             LAQ w/ VC's for full range ext and quad set at end range             Supine hooklying add squeeze             Supine hooklying hip abd with TB             Supine bridge                                                                 Ther Activity             STS with cushions (start with 2)             Mini squats (with bar and chair behind pt)             Stepups (start with 4\" step)                          HEP instruction issued                         Gait Training             T/o clinic and also to car (parked outside sidewalk at bottom of ramp) completed                         Modalities                                                               "

## 2025-05-02 NOTE — PROGRESS NOTES
PT Evaluation     Today's date: 2025  Patient name: Baldomero Camargo  : 1940  MRN: [id number]  Referring provider: Kolton Trevizo  Dx:   Encounter Diagnosis     ICD-10-CM    1. Gait instability  R26.81       2. Generalized weakness  R53.1           Start Time: 820  Stop Time: 0915  Total time in clinic (min): 55 minutes    Assessment  Impairments: abnormal coordination, abnormal gait, abnormal muscle firing, abnormal muscle tone, abnormal or restricted ROM, abnormal movement, activity intolerance, impaired balance, impaired physical strength, lacks appropriate home exercise program, pain with function, safety issue, weight-bearing intolerance, poor posture , participation limitations, activity limitations and endurance    Assessment details: Patient is a 85 y.o. male presenting to initial examination with chief diagnosis of gait dysfunction and generalized weakness. Signs and symptoms are consistent with referring diagnoses following stroke. Primary impairments include impaired ROM, flexibility, mobility, strength, and gait/balance. As a result of impairments patient experiences limitations with functional/daily activities including ambulation, transfers, and household chores/ADLs. Educated patient regarding plan of care and answered all patient questions to patient satisfaction. Patient would benefit from skilled PT interventions to address above impairments in order to maximize functional capacity. Thank you for the referral.    Understanding of Dx/Px/POC: good     Prognosis: good    Goals  Impairment Goals: 4-6 weeks  - Patient to decrease pain by 1-4 levels to 0-2/10 at worst for improved activity tolerance.   - Patient to have improved overall strength by 1/2 - 1 grade to at least 4+/5 or WFL t/o for improved ease with ADLs/gait.   - Patient to have improved overall mobility/ROM/flexibility by at least 25-50% to WFL t/o for improved overall functional mobility.     Functional Goals: by discharge  -  Patient to discharge to independent [facility].  - Patient to increase FOTO to at least 65 for improved overall functional mobility.   - Patient to have improved ease with transfers/steps.   - Patient to have improved ease with prolonged ambulation.  - Patient to have improved endurance with ambulation safely with FWW.       Plan  Patient would benefit from: skilled physical therapy  Referral necessary: Yes  Planned modality interventions: cryotherapy and thermotherapy: hydrocollator packs    Planned therapy interventions: joint mobilization, manual therapy, massage, Zavala taping, motor coordination training, neuromuscular re-education, orthotic fitting/training, patient education, postural training, strengthening, stretching, therapeutic activities, therapeutic exercise, therapeutic training, flexibility, functional ROM exercises, gait training, home exercise program, body mechanics training, behavior modification, balance/weight bearing training, balance, activity modification, abdominal trunk stabilization and IASTM    Frequency: 2-3x week  Duration in weeks: 10  Plan of Care beginning date: 5/2/2025  Plan of Care expiration date: 7/11/2025  Treatment plan discussed with: patient      Subjective Evaluation    History of Present Illness  Mechanism of injury: Pt present at  with wife Janelle. Poor/fair historian due to memory difficulties since storke. Lives in 2 story house but everything is accessible on first level. 3 ALE from garage. Driveway is stone.     Retired .     Pt presents to PT to resume/pick off where he left off of from his last appointment in October 2024. Pt was last seen in October and at his last visit, pt and pt's wife was instructed to take pt to hospital due to having symptoms. Pt ended up being hospitalized then. Pt has catheter long term.     Difficulties/limitations: weakness/fatigue, prolonged walking, transfers, household chores/ADLs; is able to dress himself mostly; RLE is a  lot more stiff    Pt notes he has had trouble difficulty with swallowing (did have speech therapy in the past but hasn't had any recently). Instructed pt and pt's wife to notify doctor of this.     Denies any pain. Just mainly weakness in his BLE's. However, will have occasional achiness in his B/L hips.     Copied from PT on 9/17/24:  Mechanism of injury: Pt is a 83 y/o male who presents to PT with his wife with prescription for gait instability and weakness. Baldomero and his wife are uncertain of most details, however said they were generally confident over the details provided.   They stated that he had a stroke Dec 2023, and went to [facility] inpatient rehab for about 2.5 months at. Where he was using walker at end of that time. They noted that he was walking about from living room to ward way thru kitchen/dining room then back to living room. Unfortunately soon after that he fell and broke R hip March/April. He was in [facility] where they stated a R LEANA was performed. They were uncertain if he went to inpatient rehab, but stated that he was hospitalized about a month. Sounded like from what they were saying that he went to a skilled nursing center for about 1-2 months after that. Stated that after that he had home care PT 3x a week for about a month and a half and completed about 2 weeks ago.   Went to PCP on 9/4/24 where he was prescribed PT.   (Occasionally goes up stairs for exercise, however has bathroom and bedroom on first level where he mostly stays)  Medical history: Stroke Dec 2023, history of TKA's b/l, history of L hip fracture prior to stroke, R LEANA after stroke due to fall and fractured hip     Had rails installed in bathtub so he can  bath tub. Has 2 handrails for 3 stairs to enter home.     Patient Goals  Patient goals for therapy: increased strength, improved balance, increased motion, independence with ADLs/IADLs and decreased pain    Pain  Quality: dull ache        Objective  "    General Comments:      Lumbar Comments  BLE's grossly 4-/5 t/o.     B/L hip flexor/knee contractions noted.     Forward head posture/rounded shoulder posture.    Has hard time with transfers. Short step/stride length with mild shuffling.                     Precautions: h/o stroke, h/o multiple falls, fall risk, generalized weakness, poor memory, hard of hearing, h/o B/L hip replacement/surgeries      Date 5/2            Auth status:  No auth , 4 units per visit               Visits remaining until re-eval (re-eval every 10 visits) 9                                      Manuals             Gentle hamstring/gastra/hip flexor stretching                                                    Neuro Re-Ed             Sidestepping at bar with upright posture             Alt step (4\") taps with at least 1 HHA at bar                          WBOS vs NBOS with EO                           Shld flexion wall slides to promote upright posture                          Ther Ex             Bike (if able)              Seated marches             Seated heel/toe raises progressing to standing when able             LAQ w/ VC's for full range ext and quad set at end range             Supine hooklying add squeeze             Supine hooklying hip abd with TB             Supine bridge                                                                 Ther Activity             STS with cushions (start with 2)             Mini squats (with bar and chair behind pt)             Stepups (start with 4\" step)                          HEP instruction issued                         Gait Training             T/o clinic and also to car (parked outside sidewalk at bottom of ramp) completed                         Modalities                                            "

## 2025-05-13 ENCOUNTER — OFFICE VISIT (OUTPATIENT)
Dept: PHYSICAL THERAPY | Facility: CLINIC | Age: 85
End: 2025-05-13
Payer: COMMERCIAL

## 2025-05-13 DIAGNOSIS — R26.81 GAIT INSTABILITY: ICD-10-CM

## 2025-05-13 DIAGNOSIS — R53.1 GENERALIZED WEAKNESS: Primary | ICD-10-CM

## 2025-05-13 PROCEDURE — 97530 THERAPEUTIC ACTIVITIES: CPT

## 2025-05-13 PROCEDURE — 97110 THERAPEUTIC EXERCISES: CPT

## 2025-05-13 NOTE — PROGRESS NOTES
"Daily Note     Today's date: 2025  Patient name: Baldomero Camargo  : 1940  MRN: [id number]  Referring provider: Kolton Trevizo  Dx:   Encounter Diagnosis     ICD-10-CM    1. Generalized weakness  R53.1       2. Gait instability  R26.81           Start Time: 0820  Stop Time: 0900  Total time in clinic (min): 40 minutes    Subjective: Patient notes fatigue post IE. Mod LLE weakness.       Objective: See treatment diary below      Assessment: Good tolerance to first treat post IE. Focused on LE strengthening and gait training this visit. Mod LLE weakness in gait but improved reciprocal gait post cues to en courage exaggeration of step height. Patient would benefit from continued PT to improve fucntion.       Plan: Continue per plan of care.      Precautions: h/o stroke, h/o multiple falls, fall risk, generalized weakness, poor memory, hard of hearing, h/o B/L hip replacement/surgeries      Date            Auth status:  No auth , 4 units per visit    2           Visits remaining until re-eval (re-eval every 10 visits) 9 8                                     Manuals             Gentle hamstring/gastra/hip flexor stretching                                                    Neuro Re-Ed             Sidestepping at bar with upright posture             Alt step (4\") taps with at least 1 HHA at bar                          WBOS vs NBOS with EO                           Shld flexion wall slides to promote upright posture                          Ther Ex             Bike (if able)              Seated marches  2x10           Seated heel/toe raises progressing to standing when able  Seated 3x10           LAQ w/ VC's for full range ext and quad set at end range  3x10, 3\"           Supine hooklying add squeeze  Seated 2x10, 5\"           Supine hooklying hip abd with TB  Seated GTB           Supine bridge             HS stretch  10\"x10 BL                                                  Ther Activity           " "  STS with cushions (start with 2)             Mini squats (with bar and chair behind pt)             Stepups (start with 4\" step)                          HEP instruction issued                         Gait Training             T/o clinic and also to car (parked outside sidewalk at bottom of ramp) completed 1 laps full clinic w/ WC follow cues for step height LLE                        Modalities                                                  "

## 2025-05-23 ENCOUNTER — OFFICE VISIT (OUTPATIENT)
Dept: PHYSICAL THERAPY | Facility: CLINIC | Age: 85
End: 2025-05-23
Payer: COMMERCIAL

## 2025-05-23 DIAGNOSIS — R26.81 GAIT INSTABILITY: ICD-10-CM

## 2025-05-23 DIAGNOSIS — R53.1 GENERALIZED WEAKNESS: Primary | ICD-10-CM

## 2025-05-23 PROCEDURE — 97112 NEUROMUSCULAR REEDUCATION: CPT

## 2025-05-23 PROCEDURE — 97110 THERAPEUTIC EXERCISES: CPT

## 2025-05-23 NOTE — PROGRESS NOTES
"Daily Note     Today's date: 2025  Patient name: Baldomero Camargo  : 1940  MRN: [id number]  Referring provider: Kolton Trevizo  Dx:   Encounter Diagnosis     ICD-10-CM    1. Generalized weakness  R53.1       2. Gait instability  R26.81                      Subjective: leah reports compliance to HEP, he has been practicing lifting his leg while walking.       Objective: See treatment diary below      Assessment: Tolerated treatment well. Leah has good sequencing in a straight line, with good foot clearance, shuffling gait while managing turns R/L. Minimal swaying in unsupported WBOS. Very good carryover from instructions on STS sequencing, but he required step by step instructions to start. Trialed stationary bike to end session for LE ROM and endurance, with good tolerance.     Plan: Continue per plan of care.       Precautions: h/o stroke, h/o multiple falls, fall risk, generalized weakness, poor memory, hard of hearing, h/o B/L hip replacement/surgeries      Date           Auth status:  No auth , 4 units per visit    2 3          Visits remaining until re-eval (re-eval every 10 visits) 9 8 7                                    Manuals             Gentle hamstring/gastra/hip flexor stretching                                                    Neuro Re-Ed             Sidestepping at bar with upright posture   10 ft  2 laps          Alt step (4\") taps with at least 1 HHA at bar                          WBOS vs NBOS with EO    1'x2 unsupported                         Shld flexion wall slides to promote upright posture   15x                       Ther Ex             Bike (if able)    4' lvl 0 to end session.           Seated marches  2x10 2x10          Seated heel/toe raises progressing to standing when able  Seated 3x10 Seated 3x10           LAQ w/ VC's for full range ext and quad set at end range  3x10, 3\" 3x10, 3\"          Supine hooklying add squeeze  Seated 2x10, 5\" Seated 2x10, 5\"        " "  Supine hooklying hip abd with TB  Seated GTB Seated GTB          Supine bridge             HS stretch  10\"x10 BL 10\"x10 BL                                                 Ther Activity             STS with cushions (start with 2)   8x step by step instructions (2 foam)           Mini squats (with bar and chair behind pt)             Stepups (start with 4\" step)                          HEP instruction issued                         Gait Training             T/o clinic and also to car (parked outside sidewalk at bottom of ramp) completed 1 laps full clinic w/ WC follow cues for step height LLE 1 laps full clinic w/ WC follow cues for step height LLE                       Modalities                                                    "

## 2025-05-27 ENCOUNTER — OFFICE VISIT (OUTPATIENT)
Dept: PHYSICAL THERAPY | Facility: CLINIC | Age: 85
End: 2025-05-27
Payer: COMMERCIAL

## 2025-05-27 DIAGNOSIS — R26.81 GAIT INSTABILITY: ICD-10-CM

## 2025-05-27 DIAGNOSIS — R53.1 GENERALIZED WEAKNESS: Primary | ICD-10-CM

## 2025-05-27 PROCEDURE — 97110 THERAPEUTIC EXERCISES: CPT

## 2025-05-27 PROCEDURE — 97116 GAIT TRAINING THERAPY: CPT

## 2025-05-27 PROCEDURE — 97112 NEUROMUSCULAR REEDUCATION: CPT

## 2025-05-27 NOTE — PROGRESS NOTES
"Daily Note     Today's date: 2025  Patient name: Baldomero Camargo  : 1940  MRN: [id number]  Referring provider: Kolton Trevizo  Dx:   Encounter Diagnosis     ICD-10-CM    1. Generalized weakness  R53.1       2. Gait instability  R26.81             Start Time: 1215  Stop Time: 1300  Total time in clinic (min): 45 minutes    Subjective: States that he is fatigue prior to start of session.      Objective: See treatment diary below      Assessment: Mod cues for step height, but improved reciprocal gait this visit. Hand over hand instruction t/o session to maintain form. Good endurance with standing unsupported. Patient would benefit from continued PT.    Plan: Continue per plan of care.       Precautions: h/o stroke, h/o multiple falls, fall risk, generalized weakness, poor memory, hard of hearing, h/o B/L hip replacement/surgeries      Date          Auth status:  No auth , 4 units per visit    2 3 4         Visits remaining until re-eval (re-eval every 10 visits) 9 8 7 6                                   Manuals             Gentle hamstring/gastra/hip flexor stretching                                                    Neuro Re-Ed             Sidestepping at bar with upright posture   10 ft  2 laps 10 ft  2 laps         Alt step (4\") taps with at least 1 HHA at bar                          WBOS vs NBOS with EO    1'x2 unsupported   1'x2 unsupported                       Shld flexion wall slides to promote upright posture   15x 15x                      Ther Ex             Bike (if able)    4' lvl 0 to end session.  NV         Seated marches  2x10 2x10 2x10         Seated heel/toe raises progressing to standing when able  Seated 3x10 Seated 3x10  Seated 3x10          LAQ w/ VC's for full range ext and quad set at end range  3x10, 3\" 3x10, 3\" 3x10, 3\"         Supine hooklying add squeeze  Seated 2x10, 5\" Seated 2x10, 5\" Seated 2x10, 5\"         Supine hooklying hip abd with TB  Seated GTB " "Seated GTB Seated GTB         Supine bridge             HS stretch  10\"x10 BL 10\"x10 BL 10\"x10 BL                                                Ther Activity             STS with cushions (start with 2)   8x step by step instructions (2 foam)  8x step by step instructions (2 foam)     8x 1 foam         Mini squats (with bar and chair behind pt)             Stepups (start with 4\" step)                          HEP instruction issued                         Gait Training             T/o clinic and also to car (parked outside sidewalk at bottom of ramp) completed 1 laps full clinic w/ WC follow cues for step height LLE 1 laps full clinic w/ WC follow cues for step height LLE 1 lap 59ft cues for step height     At end of session to car                      Modalities                                                    "

## 2025-05-29 ENCOUNTER — OFFICE VISIT (OUTPATIENT)
Dept: PHYSICAL THERAPY | Facility: CLINIC | Age: 85
End: 2025-05-29
Payer: COMMERCIAL

## 2025-05-29 DIAGNOSIS — R53.1 GENERALIZED WEAKNESS: Primary | ICD-10-CM

## 2025-05-29 DIAGNOSIS — R26.81 GAIT INSTABILITY: ICD-10-CM

## 2025-05-29 PROCEDURE — 97110 THERAPEUTIC EXERCISES: CPT | Performed by: PHYSICAL THERAPIST

## 2025-05-29 PROCEDURE — 97116 GAIT TRAINING THERAPY: CPT | Performed by: PHYSICAL THERAPIST

## 2025-05-29 PROCEDURE — 97530 THERAPEUTIC ACTIVITIES: CPT | Performed by: PHYSICAL THERAPIST

## 2025-05-29 PROCEDURE — 97112 NEUROMUSCULAR REEDUCATION: CPT | Performed by: PHYSICAL THERAPIST

## 2025-05-29 NOTE — PROGRESS NOTES
"Daily Note     Today's date: 2025  Patient name: Baldomero Camargo  : 1940  MRN: [id number]  Referring provider: Kolton Trevizo  Dx:   Encounter Diagnosis     ICD-10-CM    1. Generalized weakness  R53.1       2. Gait instability  R26.81                     Subjective: He was a little \"working sore\" after last session but not bad. Not too bad today either.       Objective: See treatment diary below      Assessment: Tolerated treatment well. Patient would benefit from continued PT. Pt tolerated session well though was fatigued post tx. Edu on car transfers, which are difficult for pt.       Plan: Continue per plan of care.      Precautions: h/o stroke, h/o multiple falls, fall risk, generalized weakness, poor memory, hard of hearing, h/o B/L hip replacement/surgeries      Date    Auth status:  No auth , 4 units per visit    2 3 4 5   Visits remaining until re-eval (re-eval every 10 visits) 9 8 7 6 5                   Manuals        Gentle hamstring/gastra/hip flexor stretching                                Neuro Re-Ed        Sidestepping at bar with upright posture   10 ft  2 laps 10 ft  2 laps 10 ft  2 laps   Alt step (4\") taps with at least 1 HHA at bar                WBOS vs NBOS with EO    1'x2 unsupported   1'x2 unsupported  2 mins NBOS, EO, w/ head turns/nods intermittently   Modified tandem stance     One foot half fwd  X30 sec each           Shld flexion wall slides to promote upright posture   15x 15x            Ther Ex        Bike (if able)    4' lvl 0 to end session.  NV NV (unavailable today)   Seated marches  2x10 2x10 2x10 2x10   Seated heel/toe raises progressing to standing when able  Seated 3x10 Seated 3x10  Seated 3x10  Seated x10   Standing 2x10   LAQ w/ VC's for full range ext and quad set at end range  3x10, 3\" 3x10, 3\" 3x10, 3\"    seated add squeeze  Seated 2x10, 5\" Seated 2x10, 5\" Seated 2x10, 5\" Seated 2x10, 5\"   Seated hip abd with TB  Seated GTB Seated GTB " "Seated GTB Seated B TB  2x15   Supine bridge        HS stretch  10\"x10 BL 10\"x10 BL 10\"x10 BL                            Ther Activity        STS with cushions    8x step by step instructions (2 foam)  8x step by step instructions (2 foam)     8x 1 foam 8x, 6x 1 foam   Mini squats (with bar and chair behind pt)        Stepups (start with 4\" step)        Car transfer     At end of session into car w/ min A   HEP instruction issued               Gait Training        T/o clinic and also to car (parked outside sidewalk at bottom of ramp) completed 1 laps full clinic w/ WC follow cues for step height LLE 1 laps full clinic w/ WC follow cues for step height LLE 1 lap 59ft cues for step height     At end of session to car 130 ft w/ FWW and CG w/ gait belt (4.5 minutes)    At end of session to car            Modalities                                       "

## 2025-06-03 ENCOUNTER — OFFICE VISIT (OUTPATIENT)
Dept: PHYSICAL THERAPY | Facility: CLINIC | Age: 85
End: 2025-06-03
Payer: COMMERCIAL

## 2025-06-03 DIAGNOSIS — R26.81 GAIT INSTABILITY: ICD-10-CM

## 2025-06-03 DIAGNOSIS — R53.1 GENERALIZED WEAKNESS: Primary | ICD-10-CM

## 2025-06-03 PROCEDURE — 97116 GAIT TRAINING THERAPY: CPT

## 2025-06-03 PROCEDURE — 97110 THERAPEUTIC EXERCISES: CPT

## 2025-06-03 NOTE — PROGRESS NOTES
"Daily Note      Today's date: 6/3/2025  Patient name: Baldomero Camargo  : 1940  MRN: [id number]  Referring provider: Kolton Trevizo  Dx:   Encounter Diagnosis     ICD-10-CM    1. Generalized weakness  R53.1       2. Gait instability  R26.81           Subjective: Pt reports that his legs are stiff because he has been sitting in a chair for a long time. Pt states \"I can't believe I have been so stubborn\" in regards to not exercising much at home. Pt states that he also recently lost one of his hearing aids and plans on going somewhere to get another one.        Objective: See treatment diary below    Assessment: Pt tolerated treatment fair. Pt noted more rapid fatigue with exercises and presented with shortening step length and decreased gait speed during ambulation compared to previous sessions. Pt presented with 1 episode of retropulsion when descending from sit-to- which pt descended into chair with poor control and began to tilt back. PT was able to stabilize chair from tilting backward and by utilizing CG of pt. Pt required at least min assist to safely descend ramp at the end of the session prior to car transfer, as well as min assist for LE management to transfer into the car. Pt also presented with more difficult following multistep instructions, which also may be heavily in part to hearing difficulties. Educated pt's family regarding more physical assistance required in today's session due to difficulty hearing/comprehending commands.     Plan: Continue per plan of care.           POC Expires Reeval for Medicare to be completed Unit Limit Auth Expiration Date PT/OT/STVisit Limit   25 By visit N/A 4 units 25 N/A                     Precautions: h/o stroke, h/o multiple falls, fall risk, generalized weakness, poor memory, hard of hearing, h/o B/L hip replacement/surgeries      Date 5/2 5/13 5/22 5/27 5/29 6/3   Auth status:  Approved  2 3 4 5 6   Visits remaining until re-eval (re-eval " "every 10 visits) 9 8 7 6 5 4                     Manuals         Gentle hamstring/gastra/hip flexor stretching                                    Neuro Re-Ed         Sidestepping at bar with upright posture   10 ft  2 laps 10 ft  2 laps 10 ft  2 laps    Alt step (4\") taps with at least 1 HHA at bar                  WBOS vs NBOS with EO    1'x2 unsupported   1'x2 unsupported  2 mins NBOS, EO, w/ head turns/nods intermittently    Modified tandem stance     One foot half fwd  X30 sec each             Shld flexion wall slides to promote upright posture   15x 15x              Ther Ex         Bike (if able)    4' lvl 0 to end session.  NV NV (unavailable today) 5' lvl 1 warm up    Seated marches  2x10 2x10 2x10 2x10 2x10    Seated heel/toe raises progressing to standing when able  Seated 3x10 Seated 3x10  Seated 3x10  Seated x10   Standing 2x10 Seated 2x10    LAQ w/ VC's for full range ext and quad set at end range  3x10, 3\" 3x10, 3\" 3x10, 3\"  3x10    seated add squeeze  Seated 2x10, 5\" Seated 2x10, 5\" Seated 2x10, 5\" Seated 2x10, 5\" Seated 2x10 (5s)    Seated hip abd with TB  Seated GTB Seated GTB Seated GTB Seated B TB  2x15 Seated blue TB 2x15   Supine bridge         HS stretch  10\"x10 BL 10\"x10 BL 10\"x10 BL                                Ther Activity         STS with cushions    8x step by step instructions (2 foam)  8x step by step instructions (2 foam)     8x 1 foam 8x, 6x 1 foam 2x8, 1 foam w/ VC for hand placement on armrests   Mini squats (with bar and chair behind pt)         Stepups (start with 4\" step)         Car transfer     At end of session into car w/ min A    HEP instruction issued                 Gait Training         T/o clinic and also to car (parked outside sidewalk at bottom of ramp) completed 1 laps full clinic w/ WC follow cues for step height LLE 1 laps full clinic w/ WC follow cues for step height LLE 1 lap 59ft cues for step height     At end of session to car 130 ft w/ FWW and CG w/ gait belt " (4.5 minutes)    At end of session to car  100 ft w/ FWW w/ CG and gait belt (2 minutes)    130 w/ FWW w/ CG and gait belt, followed by car transfer w/ min/mod A            Modalities

## 2025-06-05 ENCOUNTER — OFFICE VISIT (OUTPATIENT)
Dept: PHYSICAL THERAPY | Facility: CLINIC | Age: 85
End: 2025-06-05
Payer: COMMERCIAL

## 2025-06-05 DIAGNOSIS — R26.81 GAIT INSTABILITY: ICD-10-CM

## 2025-06-05 DIAGNOSIS — R53.1 GENERALIZED WEAKNESS: Primary | ICD-10-CM

## 2025-06-05 PROCEDURE — 97530 THERAPEUTIC ACTIVITIES: CPT | Performed by: PHYSICAL THERAPIST

## 2025-06-05 PROCEDURE — 97116 GAIT TRAINING THERAPY: CPT | Performed by: PHYSICAL THERAPIST

## 2025-06-05 PROCEDURE — 97110 THERAPEUTIC EXERCISES: CPT | Performed by: PHYSICAL THERAPIST

## 2025-06-05 NOTE — PROGRESS NOTES
"Daily Note     Today's date: 2025  Patient name: Baldomero Camargo  : 1940  MRN: [id number]  Referring provider: Kolton Trevizo  Dx:   Encounter Diagnosis     ICD-10-CM    1. Generalized weakness  R53.1       2. Gait instability  R26.81                      Subjective: He fell coming into the foyer the other days - \"I just ran out of gas\". The neighbor came over and helped him up onto a seat. His L hip especially is bothering him - it's been worsening over 1-2 weeks. It's tired to get it working and his foot has been dragging a bit.       Objective: See treatment diary below      Assessment: Tolerated treatment well. Patient would benefit from continued PT. Pt demonstrated great difficulty w/ ambulation, w/ notable L hip flexion difficulty and foot drag. He reported feeling \"unsteady\" and required CG-min A w/ ambulation w/ VC for hip flexion and foot clearance frequently. He was unable to perform sit<>stand today w/o UE use, which previously he had been able to do. I advised pt and his wife to consider getting/using WC for transport to/from car if his walking continues as presented today. Min-mod A w/ ramp descent when walking to car w/ poor FWW speed control. He presented at high risk for falls and would benefit from ongoing PT at this time.       Plan: Continue per plan of care.        POC Expires Reeval for Medicare to be completed Unit Limit Auth Expiration Date PT/OT/STVisit Limit   25 By visit N/A 4 units 25 N/A                     Precautions: h/o stroke, h/o multiple falls, fall risk, generalized weakness, poor memory, hard of hearing, h/o B/L hip replacement/surgeries      Date 5/22 5/27 5/29 6/3 6/5   Auth status:  Approved 3 4 5 6 7   Visits remaining until re-eval (re-eval every 10 visits) 7 6 5 4 3                   Manuals        Gentle hamstring/gastra/hip flexor stretching                                Neuro Re-Ed        Sidestepping at bar with upright posture 10 ft  2 laps 10 " "ft  2 laps 10 ft  2 laps     Alt step (4\") taps with at least 1 HHA at bar                WBOS vs NBOS with EO  1'x2 unsupported   1'x2 unsupported  2 mins NBOS, EO, w/ head turns/nods intermittently     Modified tandem stance   One foot half fwd  X30 sec each             Shld flexion wall slides to promote upright posture 15x 15x              Ther Ex        Bike (if able)  4' lvl 0 to end session.  NV NV (unavailable today) 5' lvl 1 warm up     Seated marches 2x10 2x10 2x10 2x10  2x10    Seated heel/toe raises progressing to standing when able Seated 3x10  Seated 3x10  Seated x10   Standing 2x10 Seated 2x10  Seated 2x10    LAQ w/ VC's for full range ext and quad set at end range 3x10, 3\" 3x10, 3\"  3x10     seated add squeeze Seated 2x10, 5\" Seated 2x10, 5\" Seated 2x10, 5\" Seated 2x10 (5s)     Seated hip abd with TB Seated GTB Seated GTB Seated B TB  2x15 Seated blue TB 2x15 Seated blue TB 2x15   Supine bridge        HS stretch 10\"x10 BL 10\"x10 BL   3x10 sec   Slouch overcorrect     3x5                    Ther Activity        STS with cushions  8x step by step instructions (2 foam)  8x step by step instructions (2 foam)     8x 1 foam 8x, 6x 1 foam 2x8, 1 foam w/ VC for hand placement on armrests Unable even w/ 2 foam today   Mini squats (with bar and chair behind pt)     2x10 (at walker)   Stepups (start with 4\" step)        Car transfer   At end of session into car w/ min A  At end of session into car w/ min-mod A   HEP instruction                Gait Training        T/o clinic and also to car (parked outside sidewalk at bottom of ramp) 1 laps full clinic w/ WC follow cues for step height LLE 1 lap 59ft cues for step height     At end of session to car 130 ft w/ FWW and CG w/ gait belt (4.5 minutes)    At end of session to car  100 ft w/ FWW w/ CG and gait belt (2 minutes)    130 w/ FWW w/ CG and gait belt, followed by car transfer w/ min/mod A 3x60 ft w/ FWW w/ CG and gait belt    To car W/ FWW w/ CG and gait " belt, followed by car transfer w/ min/mod A           Modalities

## 2025-06-07 ENCOUNTER — HOSPITAL ENCOUNTER (EMERGENCY)
Dept: HOSPITAL 99 - EMR | Age: 85
Discharge: HOME | End: 2025-06-07
Payer: COMMERCIAL

## 2025-06-07 VITALS — BODY MASS INDEX: 25.2 KG/M2

## 2025-06-07 VITALS — SYSTOLIC BLOOD PRESSURE: 126 MMHG | DIASTOLIC BLOOD PRESSURE: 78 MMHG

## 2025-06-07 VITALS — DIASTOLIC BLOOD PRESSURE: 72 MMHG | SYSTOLIC BLOOD PRESSURE: 137 MMHG

## 2025-06-07 VITALS — SYSTOLIC BLOOD PRESSURE: 139 MMHG | DIASTOLIC BLOOD PRESSURE: 82 MMHG

## 2025-06-07 DIAGNOSIS — R33.9: ICD-10-CM

## 2025-06-07 DIAGNOSIS — E03.9: ICD-10-CM

## 2025-06-07 DIAGNOSIS — E78.00: ICD-10-CM

## 2025-06-07 DIAGNOSIS — N40.1: ICD-10-CM

## 2025-06-07 DIAGNOSIS — I10: ICD-10-CM

## 2025-06-07 DIAGNOSIS — Z86.73: ICD-10-CM

## 2025-06-07 DIAGNOSIS — Z96.612: ICD-10-CM

## 2025-06-07 DIAGNOSIS — K21.9: ICD-10-CM

## 2025-06-07 DIAGNOSIS — Z87.891: ICD-10-CM

## 2025-06-07 DIAGNOSIS — Z46.6: Primary | ICD-10-CM

## 2025-06-07 DIAGNOSIS — K59.00: Primary | ICD-10-CM

## 2025-06-07 DIAGNOSIS — R33.8: ICD-10-CM

## 2025-06-07 DIAGNOSIS — Z96.642: ICD-10-CM

## 2025-06-07 DIAGNOSIS — M19.90: ICD-10-CM

## 2025-06-07 DIAGNOSIS — Z96.653: ICD-10-CM

## 2025-06-07 PROCEDURE — 99283 EMERGENCY DEPT VISIT LOW MDM: CPT

## 2025-06-07 PROCEDURE — 99282 EMERGENCY DEPT VISIT SF MDM: CPT

## 2025-06-07 RX ADMIN — MAGNESIUM CITRATE 300 ML: 1.75 LIQUID ORAL at 19:30

## 2025-06-09 ENCOUNTER — OFFICE VISIT (OUTPATIENT)
Dept: PHYSICAL THERAPY | Facility: CLINIC | Age: 85
End: 2025-06-09
Payer: COMMERCIAL

## 2025-06-09 DIAGNOSIS — R26.81 GAIT INSTABILITY: ICD-10-CM

## 2025-06-09 DIAGNOSIS — R53.1 GENERALIZED WEAKNESS: Primary | ICD-10-CM

## 2025-06-09 PROCEDURE — 97110 THERAPEUTIC EXERCISES: CPT

## 2025-06-09 PROCEDURE — 97530 THERAPEUTIC ACTIVITIES: CPT

## 2025-06-09 PROCEDURE — 97116 GAIT TRAINING THERAPY: CPT

## 2025-06-09 NOTE — PROGRESS NOTES
"Daily Note     Today's date: 2025  Patient name: Baldomero Camargo  : 1940  MRN: [id number]  Referring provider: Kolton Trevizo  Dx:   Encounter Diagnosis     ICD-10-CM    1. Generalized weakness  R53.1       2. Gait instability  R26.81           Start Time: 947  Stop Time: 1040  Total time in clinic (min): 53 minutes    Subjective: Pt reports he is doing okay today. Has no new chief complaints noted.       Objective: See treatment diary below    Assessment: Tolerated treatment well. Pt required VC's for proper form/pace of ex's. VC's for picking feet up w/ ambulation w/ FWW. Gait belt required for GT out to car and for car transfer for safety. Patient demonstrated fatigue post treatment, exhibited good technique with therapeutic exercises, and would benefit from continued PT      Plan: Continue per plan of care.        POC Expires Reeval for Medicare to be completed Unit Limit Auth Expiration Date PT/OT/STVisit Limit   25 By visit N/A 4 units 25 N/A                     Precautions: h/o stroke, h/o multiple falls, fall risk, generalized weakness, poor memory, hard of hearing, h/o B/L hip replacement/surgeries      Date 6/3 6/5 6/9     Auth status:  Approved 6 7 8     Visits remaining until re-eval (re-eval every 10 visits) 4 3 2                     Manuals        Gentle hamstring/gastra/hip flexor stretching                                Neuro Re-Ed        Sidestepping at bar with upright posture        Alt step (4\") taps with at least 1 HHA at bar                WBOS vs NBOS with EO         Modified tandem stance                Shld flexion wall slides to promote upright posture                Ther Ex        Bike (if able)  5' lvl 1 warm up   6' lvl 1 warmup     Seated marches 2x10  2x10  2x10 with PT holding hand above thighs to encourage higher marches     Seated heel/toe raises progressing to standing when able Seated 2x10  Seated 2x10  Seated 3x10     LAQ w/ VC's for full range ext and " "quad set at end range 3x10   3x10      seated add squeeze Seated 2x10 (5s)   Seated 3x10 5\"     Seated hip abd with TB Seated blue TB 2x15 Seated blue TB 2x15 Seated blue TB 2x15     Supine bridge        HS stretch  3x10 sec 5x20\" with foot propped on 8\" step with maual overpressure     Slouch overcorrect  3x5                       Ther Activity        STS with cushions  2x8, 1 foam w/ VC for hand placement on armrests Unable even w/ 2 foam today 2x8, 1 foam w/ VC for hand placement on armrests      Mini squats (with bar and chair behind pt)  2x10 (at walker) nv     Stepups (start with 4\" step)        Car transfer  At end of session into car w/ min-mod A @ end of session into car w/ min-mod A      HEP instruction                Gait Training        T/o clinic and also to car (parked outside sidewalk at bottom of ramp) 100 ft w/ FWW w/ CG and gait belt (2 minutes)    130 w/ FWW w/ CG and gait belt, followed by car transfer w/ min/mod A 3x60 ft w/ FWW w/ CG and gait belt    To car W/ FWW w/ CG and gait belt, followed by car transfer w/ min/mod A To car w/ FWW with CG and gait belt f/b car transfer with min/mod A              Modalities                                            "

## 2025-06-11 ENCOUNTER — OFFICE VISIT (OUTPATIENT)
Dept: PHYSICAL THERAPY | Facility: CLINIC | Age: 85
End: 2025-06-11
Payer: COMMERCIAL

## 2025-06-11 DIAGNOSIS — R26.81 GAIT INSTABILITY: ICD-10-CM

## 2025-06-11 DIAGNOSIS — R53.1 GENERALIZED WEAKNESS: Primary | ICD-10-CM

## 2025-06-11 PROCEDURE — 97530 THERAPEUTIC ACTIVITIES: CPT

## 2025-06-11 PROCEDURE — 97116 GAIT TRAINING THERAPY: CPT

## 2025-06-11 PROCEDURE — 97110 THERAPEUTIC EXERCISES: CPT

## 2025-06-11 NOTE — PROGRESS NOTES
"Daily Note     Today's date: 2025  Patient name: Baldomero Camargo  : 1940  MRN: [id number]  Referring provider: Kolton Trevizo  Dx:   Encounter Diagnosis     ICD-10-CM    1. Generalized weakness  R53.1       2. Gait instability  R26.81           Start Time: 1032  Stop Time: 1120  Total time in clinic (min): 48 minutes    Subjective: Pt reports he was a little sore following his LV.  Has been having a ringing in his L ear that has been bothering him for the past week.        Objective: See treatment diary below      Assessment: Tolerated treatment well. Continued program as outlined below with focus on improving strength and normalizing gait.  Frequent cues with ambulation around clinic to increase step length with LLE, otherwise performed with step-to gait pattern.  Step length typically shortest while performing turns.  Able to complete STS with no assistance from UE, but needed to be proceeded with a round of mini squats for proper neuromuscular feedback to perform STS.  Patient would benefit from continued PT to further improve upon these deficits, reduce risk for falls, and maximize overall function with ADL's.        Plan: Continue per plan of care.        POC Expires Reeval for Medicare to be completed Unit Limit Auth Expiration Date PT/OT/STVisit Limit   25 By visit N/A 4 units 25 N/A                     Precautions: h/o stroke, h/o multiple falls, fall risk, generalized weakness, poor memory, hard of hearing, h/o B/L hip replacement/surgeries      Date 6/3 6/5 6/9 6/11    Auth status:  Approved 6 7 8 9    Visits remaining until re-eval (re-eval every 10 visits) 4 3 2 1                    Manuals        Gentle hamstring/gastra/hip flexor stretching                                Neuro Re-Ed        Sidestepping at bar with upright posture        Alt step (4\") taps with at least 1 HHA at bar                WBOS vs NBOS with EO         Modified tandem stance                Shld flexion " "wall slides to promote upright posture                Ther Ex        Bike (if able)  5' lvl 1 warm up   6' lvl 1 warmup     Seated marches 2x10  2x10  2x10 with PT holding hand above thighs to encourage higher marches 2x10    Seated heel/toe raises progressing to standing when able Seated 2x10  Seated 2x10  Seated 3x10 Seated 3x10    LAQ w/ VC's for full range ext and quad set at end range 3x10   3x10      seated add squeeze Seated 2x10 (5s)   Seated 3x10 5\" Seated 3x10 5\"    Seated hip abd with TB Seated blue TB 2x15 Seated blue TB 2x15 Seated blue TB 2x15 Seated blue TB 2x15    Supine bridge        HS stretch  3x10 sec 5x20\" with foot propped on 8\" step with maual overpressure 3x30\" with foot propped on 8\" step with SOS    Slouch overcorrect  3x5                       Ther Activity        STS with cushions  2x8, 1 foam w/ VC for hand placement on armrests Unable even w/ 2 foam today 2x8, 1 foam w/ VC for hand placement on armrests  2x8, 1 airex  No UE    Mini squats (with bar and chair behind pt)  2x10 (at walker) nv 2x10 at walker    Stepups (start with 4\" step)        Car transfer  At end of session into car w/ min-mod A @ end of session into car w/ min-mod A      HEP instruction                Gait Training        T/o clinic and also to car (parked outside sidewalk at bottom of ramp) 100 ft w/ FWW w/ CG and gait belt (2 minutes)    130 w/ FWW w/ CG and gait belt, followed by car transfer w/ min/mod A 3x60 ft w/ FWW w/ CG and gait belt    To car W/ FWW w/ CG and gait belt, followed by car transfer w/ min/mod A To car w/ FWW with CG and gait belt f/b car transfer with min/mod A  3x60 ft w/ FWW w/ CG and gait belt            Modalities                                              "

## 2025-06-13 ENCOUNTER — APPOINTMENT (OUTPATIENT)
Dept: PHYSICAL THERAPY | Facility: CLINIC | Age: 85
End: 2025-06-13
Payer: COMMERCIAL

## 2025-06-16 ENCOUNTER — OFFICE VISIT (OUTPATIENT)
Dept: PHYSICAL THERAPY | Facility: CLINIC | Age: 85
End: 2025-06-16
Payer: COMMERCIAL

## 2025-06-16 DIAGNOSIS — R26.81 GAIT INSTABILITY: ICD-10-CM

## 2025-06-16 DIAGNOSIS — R53.1 GENERALIZED WEAKNESS: Primary | ICD-10-CM

## 2025-06-16 PROCEDURE — 97530 THERAPEUTIC ACTIVITIES: CPT | Performed by: PHYSICAL THERAPIST

## 2025-06-16 PROCEDURE — 97116 GAIT TRAINING THERAPY: CPT | Performed by: PHYSICAL THERAPIST

## 2025-06-16 PROCEDURE — 97110 THERAPEUTIC EXERCISES: CPT | Performed by: PHYSICAL THERAPIST

## 2025-06-16 NOTE — PROGRESS NOTES
"Daily Note     Today's date: 2025  Patient name: Baldomero Camargo  : 1940  MRN: [id number]  Referring provider: Kolton Trevizo  Dx:   Encounter Diagnosis     ICD-10-CM    1. Generalized weakness  R53.1       2. Gait instability  R26.81                      Subjective: L hip has still been a bit sore. Once he gets up and moving more it generally feels a bit better. Had a fall last week stepping in from the garage and missed than hand railing.       Objective: See treatment diary below      Assessment: Tolerated treatment well. Patient would benefit from continued PT. Pt tolerated session well though was fatigued by the end of the session. Able to navigate 2 steps down in ambulation towards car at end of session w/ CG-min A though was challenging.       Plan: Continue per plan of care.        POC Expires Reeval for Medicare to be completed Unit Limit Auth Expiration Date PT/OT/STVisit Limit   25 By visit N/A 4 units 25 N/A                     Precautions: h/o stroke, h/o multiple falls, fall risk, generalized weakness, poor memory, hard of hearing, h/o B/L hip replacement/surgeries      Date 6/3 6/5 6/9 6/11 6/16 NV RE   Auth status:  Approved 6 7 8 9 10    Visits remaining until re-eval (re-eval every 10 visits) 4 3 2 1 0                      Manuals         Gentle hamstring/gastra/hip flexor stretching                                    Neuro Re-Ed         Sidestepping at bar with upright posture         Alt step (4\") taps with at least 1 HHA at bar                  WBOS vs NBOS with EO          Modified tandem stance                  Shld flexion wall slides to promote upright posture                  Ther Ex         Bike (if able)  5' lvl 1 warm up   6' lvl 1 warmup      Seated marches 2x10  2x10  2x10 with PT holding hand above thighs to encourage higher marches 2x10 2x15    Seated heel/toe raises progressing to standing when able Seated 2x10  Seated 2x10  Seated 3x10 Seated 3x10 Seated " "2x10    Standing x10 (B UE at walker)    LAQ w/ VC's for full range ext and quad set at end range 3x10   3x10       seated add squeeze Seated 2x10 (5s)   Seated 3x10 5\" Seated 3x10 5\"     Seated hip abd with TB Seated blue TB 2x15 Seated blue TB 2x15 Seated blue TB 2x15 Seated blue TB 2x15     Supine bridge         HS stretch  3x10 sec 5x20\" with foot propped on 8\" step with maual overpressure 3x30\" with foot propped on 8\" step with SOS 3x30 sec (foot on 4\" step; cued for hip hinge rather than lumbar flexion)    Slouch overcorrect  3x5                          Ther Activity         STS with cushions  2x8, 1 foam w/ VC for hand placement on armrests Unable even w/ 2 foam today 2x8, 1 foam w/ VC for hand placement on armrests  2x8, 1 airex  No UE 2x10, 2 airex  No UE    Mini squats (with bar and chair behind pt)  2x10 (at walker) nv 2x10 at walker 2x10 at walker    Stepups (start with 4\" step)         Car transfer  At end of session into car w/ min-mod A @ end of session into car w/ min-mod A   @ end of session into car w/ min-mod A     HEP instruction         steps     4\" x5 each B UE    8\" step down w/ railing and HHA (w/ gait belt and CG-min A) - outside             Gait Training         T/o clinic and also to car (parked outside sidewalk at bottom of ramp) 100 ft w/ FWW w/ CG and gait belt (2 minutes)    130 w/ FWW w/ CG and gait belt, followed by car transfer w/ min/mod A 3x60 ft w/ FWW w/ CG and gait belt    To car W/ FWW w/ CG and gait belt, followed by car transfer w/ min/mod A To car w/ FWW with CG and gait belt f/b car transfer with min/mod A  3x60 ft w/ FWW w/ CG and gait belt 100ft and 45 ft  FWW w/ CG and gait belt    f/b ambulation to car w/ transfer with min/mod A              Modalities                                                   "

## 2025-06-18 ENCOUNTER — EVALUATION (OUTPATIENT)
Dept: PHYSICAL THERAPY | Facility: CLINIC | Age: 85
End: 2025-06-18
Payer: COMMERCIAL

## 2025-06-18 DIAGNOSIS — R26.81 GAIT INSTABILITY: ICD-10-CM

## 2025-06-18 DIAGNOSIS — R53.1 GENERALIZED WEAKNESS: Primary | ICD-10-CM

## 2025-06-18 PROCEDURE — 97530 THERAPEUTIC ACTIVITIES: CPT | Performed by: PHYSICAL THERAPIST

## 2025-06-18 PROCEDURE — 97116 GAIT TRAINING THERAPY: CPT | Performed by: PHYSICAL THERAPIST

## 2025-06-18 PROCEDURE — 97110 THERAPEUTIC EXERCISES: CPT | Performed by: PHYSICAL THERAPIST

## 2025-06-18 NOTE — LETTER
2025    Kolton Trevizo  [address] [facility] [place] [id number]    Patient: Baldomero Camargo   YOB: 1940   Date of Visit: 2025     Encounter Diagnosis     ICD-10-CM    1. Generalized weakness  R53.1       2. Gait instability  R26.81           Dear Dr. Kolton Trevizo:    Thank you for your recent referral of Baldomero Camargo. Please review the attached evaluation summary from Baldomero's recent visit.     Please verify that you agree with the plan of care by signing the attached order.     If you have any questions or concerns, please do not hesitate to call.     I sincerely appreciate the opportunity to share in the care of one of your patients and hope to have another opportunity to work with you in the near future.       Sincerely,    Ariella Carrington, PT      Referring Provider:      I certify that I have read the below Plan of Care and certify the need for these services furnished under this plan of treatment while under my care.                    Kolton Trevizo  [address] [facility] [address] [place] [id number]  Via Fax: [phone]          PT Re-Evaluation     Today's date: 2025  Patient name: Baldomero Camargo  : 1940  MRN: [id number]  Referring provider: Kolton Trevizo  Dx:   Encounter Diagnosis     ICD-10-CM    1. Generalized weakness  R53.1       2. Gait instability  R26.81                    Assessment  Impairments: abnormal coordination, abnormal gait, abnormal muscle firing, abnormal muscle tone, abnormal or restricted ROM, abnormal movement, activity intolerance, impaired balance, impaired physical strength, lacks appropriate home exercise program, pain with function, safety issue, weight-bearing intolerance, poor posture , participation limitations, activity limitations and endurance    Assessment details: Patient is a 85 y.o. male presenting to initial examination with chief diagnosis of gait dysfunction and generalized weakness. Signs and  symptoms are consistent with referring diagnoses following stroke. Primary impairments include impaired ROM, flexibility, mobility, strength, and gait/balance. As a result of impairments patient experiences limitations with functional/daily activities including ambulation, transfers, and household chores/ADLs. Educated patient regarding plan of care and answered all patient questions to patient satisfaction. Patient would benefit from skilled PT interventions to address above impairments in order to maximize functional capacity. Thank you for the referral.    Understanding of Dx/Px/POC: good     Prognosis: good    UPDATE 6/18/25:   Pt has been progressing overall, though notable impairments remain, as expected at this time. He exhibits some improvements in ambulation endurance, though gait pattern, speed, and endurance are still considerably impaired. He exhibits ongoing shortness in B hip flexors, knee flexors and extensors, contributing to abnormal biomechanics. He exhibits improvement, though still difficulty in stairs navigation. He remains at high risk for falls, and has had several falls within the last few weeks. He exhibits some improved strength t/o B LE, though some weakness remains. He continues to have severe limitations in ambulation endurance and ongoing need for assistance in activities such as car transfers, dressing, and household chores. He has been progressing overall and would benefit from continued physical therapy to address these impairments and to maximize functional abilities. Due to the extent and chronicity of his condition, his progress is expected to be slow.     Goals   Impairment Goals: 4-6 weeks  - Patient to decrease pain by 1-4 levels to 0-2/10 at worst for improved activity tolerance. - PROGRESSING TOWARDS GOAL  - Patient to have improved overall strength by 1/2 - 1 grade to at least 4+/5 or WFL t/o for improved ease with ADLs/gait. - PROGRESSING TOWARDS GOAL  - Patient to have  improved overall mobility/ROM/flexibility by at least 25-50% to WFL t/o for improved overall functional mobility. - PROGRESSING TOWARDS GOAL    Functional Goals: by discharge  - Patient to discharge to independent HEP.- PROGRESSING TOWARDS GOAL  - Patient to have improved ease with transfers/steps. - PROGRESSING TOWARDS GOAL  - Patient to have improved ease with prolonged ambulation.- PROGRESSING TOWARDS GOAL  - Patient to have improved endurance with ambulation safely with FWW. - PROGRESSING TOWARDS GOAL      Plan   Patient would benefit from: skilled physical therapy  Referral necessary: Yes  Planned modality interventions: cryotherapy and thermotherapy: hydrocollator packs    Planned therapy interventions: joint mobilization, manual therapy, massage, Zavala taping, motor coordination training, neuromuscular re-education, orthotic fitting/training, patient education, postural training, strengthening, stretching, therapeutic activities, therapeutic exercise, therapeutic training, flexibility, functional ROM exercises, gait training, home exercise program, body mechanics training, behavior modification, balance/weight bearing training, balance, activity modification, abdominal trunk stabilization and IASTM    Frequency: 2-3x week  Duration in weeks: 8  Plan of Care beginning date: 6/18/2025  Plan of Care expiration date: 9/13/2025  Treatment plan discussed with: patient      Subjective Evaluation    History of Present Illness  Mechanism of injury: Pt present at  with wife Janelle. Poor/fair historian due to memory difficulties since storke. Lives in 2 story house but everything is accessible on first level. 3 ALE from garage. Driveway is stone.     Retired .     Pt presents to PT to resume/pick off where he left off of from his last appointment in October 2024. Pt was last seen in October and at his last visit, pt and pt's wife was instructed to take pt to hospital due to having symptoms. Pt ended up  being hospitalized then. Pt has catheter long term.     Difficulties/limitations: weakness/fatigue, prolonged walking, transfers, household chores/ADLs; is able to dress himself mostly; RLE is a lot more stiff    Pt notes he has had trouble difficulty with swallowing (did have speech therapy in the past but hasn't had any recently). Instructed pt and pt's wife to notify doctor of this.     Denies any pain. Just mainly weakness in his BLE's. However, will have occasional achiness in his B/L hips.     Copied from PT on 9/17/24:  Mechanism of injury: Eyad is a 85 y/o male who presents to PT with his wife with prescription for gait instability and weakness. Baldomero and his wife are uncertain of most details, however said they were generally confident over the details provided.   They stated that he had a stroke Dec 2023, and went to [facility] inpatient rehab for about 2.5 months at. Where he was using walker at end of that time. They noted that he was walking about from living room to ward way thru kitchen/dining room then back to living room. Unfortunately soon after that he fell and broke R hip March/April. He was in [facility] where they stated a R LEANA was performed. They were uncertain if he went to inpatient rehab, but stated that he was hospitalized about a month. Sounded like from what they were saying that he went to a skilled nursing center for about 1-2 months after that. Stated that after that he had home care PT 3x a week for about a month and a half and completed about 2 weeks ago.   Went to PCP on 9/4/24 where he was prescribed PT.   (Occasionally goes up stairs for exercise, however has bathroom and bedroom on first level where he mostly stays)  Medical history: Stroke Dec 2023, history of TKA's b/l, history of L hip fracture prior to stroke, R LEANA after stroke due to fall and fractured hip     Had rails installed in bathtub so he can  bath tub. Has 2 handrails for 3 stairs to enter home.  "    Patient Goals  Patient goals for therapy: increased strength, improved balance, increased motion, independence with ADLs/IADLs and decreased pain    UPDATE 6/18/25:   He feels he's improving some. His hips feel a bit stronger and stairs are a bit easier to navigate. Walking is still difficult and tiresome. He still needs help dressing. He would like to be able to walk outside but has gravel driveway and isn't good on that. He's had several falls w/in recent weeks.    Pain  Quality: dull ache   0-6/10 B hips        Objective     General Comments:      Lumbar Comments  4/5 B hip flexion, knee flexion and extension,   4+/5 B ankle DF      B/L hip flexor/knee contractions noted.     Forward head posture/rounded shoulder posture; excess thoracic and lumbar flexion in sitting.    Has difficulty with transfers. Short step/stride length with mild shuffling.     5xSTS : 26 sec w/ B UE use  2MWT: 65 ft w/ FWW and CG/gait belt    POC Expires Reeval for Medicare to be completed Unit Limit Auth Expiration Date PT/OT/STVisit Limit   9/13/25  By visit N/A 4 units 12/31/25 N/A                     Precautions: h/o stroke, h/o multiple falls, fall risk, generalized weakness, poor memory, hard of hearing, h/o B/L hip replacement/surgeries    Date 6/5 6/9 6/11 6/16 6/18 RE    Auth status:  Approved 7 8 9 10 1   Visits remaining until re-eval (re-eval every 10 visits) 3 2 1 0 9                   Manuals        Gentle hamstring/gastra/hip flexor stretching                                Neuro Re-Ed        Sidestepping at bar with upright posture        Alt step (4\") taps with at least 1 HHA at bar                WBOS vs NBOS with EO         Modified tandem stance                Shld flexion wall slides to promote upright posture                Ther Ex        Edu on progress and POC     Edu on progress and POC   Bike (if able)   6' lvl 1 warmup   3 minutes level 1   Seated marches 2x10  2x10 with PT holding hand above thighs to " "encourage higher marches 2x10 2x15    Seated heel/toe raises progressing to standing when able Seated 2x10  Seated 3x10 Seated 3x10 Seated 2x10    Standing x10 (B UE at walker)    LAQ w/ VC's for full range ext and quad set at end range  3x10       seated add squeeze  Seated 3x10 5\" Seated 3x10 5\"     Seated hip abd with TB Seated blue TB 2x15 Seated blue TB 2x15 Seated blue TB 2x15     Supine bridge        HS stretch 3x10 sec 5x20\" with foot propped on 8\" step with maual overpressure 3x30\" with foot propped on 8\" step with SOS 3x30 sec (foot on 4\" step; cued for hip hinge rather than lumbar flexion)    Slouch overcorrect 3x5                        Ther Activity        STS with cushions  Unable even w/ 2 foam today 2x8, 1 foam w/ VC for hand placement on armrests  2x8, 1 airex  No UE 2x10, 2 airex  No UE x10, 2 airex  No UE    X5 B UE w/o airex   Mini squats (with bar and chair behind pt) 2x10 (at walker) nv 2x10 at walker 2x10 at walker x10 at walker   Stepups (start with 4\" step)        Car transfer At end of session into car w/ min-mod A @ end of session into car w/ min-mod A   @ end of session into car w/ min-mod A  @ end of session into car w/ min-mod A    HEP instruction        steps    4\" x5 each B UE    8\" step down w/ railing and HHA (w/ gait belt and CG-min A) - outside 8\" step down x2 w/ railing and HHA (w/ gait belt and CG-min A) - outside           Gait Training        T/o clinic and also to car (parked outside sidewalk at bottom of ramp) 3x60 ft w/ FWW w/ CG and gait belt    To car W/ FWW w/ CG and gait belt, followed by car transfer w/ min/mod A To car w/ FWW with CG and gait belt f/b car transfer with min/mod A  3x60 ft w/ FWW w/ CG and gait belt 100ft and 45 ft  FWW w/ CG and gait belt    f/b ambulation to car w/ transfer with min/mod A  108 ft and 65 ft  FWW w/ CG and gait belt    f/b ambulation to car w/ transfer with min/mod A            Modalities                                                "

## 2025-06-18 NOTE — PROGRESS NOTES
PT Re-Evaluation     Today's date: 2025  Patient name: Baldomero Camargo  : 1940  MRN: [id number]  Referring provider: Kolton Trevizo  Dx:   Encounter Diagnosis     ICD-10-CM    1. Generalized weakness  R53.1       2. Gait instability  R26.81                    Assessment  Impairments: abnormal coordination, abnormal gait, abnormal muscle firing, abnormal muscle tone, abnormal or restricted ROM, abnormal movement, activity intolerance, impaired balance, impaired physical strength, lacks appropriate home exercise program, pain with function, safety issue, weight-bearing intolerance, poor posture , participation limitations, activity limitations and endurance    Assessment details: Patient is a 85 y.o. male presenting to initial examination with chief diagnosis of gait dysfunction and generalized weakness. Signs and symptoms are consistent with referring diagnoses following stroke. Primary impairments include impaired ROM, flexibility, mobility, strength, and gait/balance. As a result of impairments patient experiences limitations with functional/daily activities including ambulation, transfers, and household chores/ADLs. Educated patient regarding plan of care and answered all patient questions to patient satisfaction. Patient would benefit from skilled PT interventions to address above impairments in order to maximize functional capacity. Thank you for the referral.    Understanding of Dx/Px/POC: good     Prognosis: good    UPDATE 25:   Pt has been progressing overall, though notable impairments remain, as expected at this time. He exhibits some improvements in ambulation endurance, though gait pattern, speed, and endurance are still considerably impaired. He exhibits ongoing shortness in B hip flexors, knee flexors and extensors, contributing to abnormal biomechanics. He exhibits improvement, though still difficulty in stairs navigation. He remains at high risk for falls, and has had several falls  within the last few weeks. He exhibits some improved strength t/o B LE, though some weakness remains. He continues to have severe limitations in ambulation endurance and ongoing need for assistance in activities such as car transfers, dressing, and household chores. He has been progressing overall and would benefit from continued physical therapy to address these impairments and to maximize functional abilities. Due to the extent and chronicity of his condition, his progress is expected to be slow.     Goals   Impairment Goals: 4-6 weeks  - Patient to decrease pain by 1-4 levels to 0-2/10 at worst for improved activity tolerance. - PROGRESSING TOWARDS GOAL  - Patient to have improved overall strength by 1/2 - 1 grade to at least 4+/5 or WFL t/o for improved ease with ADLs/gait. - PROGRESSING TOWARDS GOAL  - Patient to have improved overall mobility/ROM/flexibility by at least 25-50% to WFL t/o for improved overall functional mobility. - PROGRESSING TOWARDS GOAL    Functional Goals: by discharge  - Patient to discharge to independent [facility].- PROGRESSING TOWARDS GOAL  - Patient to have improved ease with transfers/steps. - PROGRESSING TOWARDS GOAL  - Patient to have improved ease with prolonged ambulation.- PROGRESSING TOWARDS GOAL  - Patient to have improved endurance with ambulation safely with FWW. - PROGRESSING TOWARDS GOAL      Plan   Patient would benefit from: skilled physical therapy  Referral necessary: Yes  Planned modality interventions: cryotherapy and thermotherapy: hydrocollator packs    Planned therapy interventions: joint mobilization, manual therapy, massage, Zavala taping, motor coordination training, neuromuscular re-education, orthotic fitting/training, patient education, postural training, strengthening, stretching, therapeutic activities, therapeutic exercise, therapeutic training, flexibility, functional ROM exercises, gait training, home exercise program, body mechanics training, behavior  modification, balance/weight bearing training, balance, activity modification, abdominal trunk stabilization and IASTM    Frequency: 2-3x week  Duration in weeks: 8  Plan of Care beginning date: 6/18/2025  Plan of Care expiration date: 9/13/2025  Treatment plan discussed with: patient      Subjective Evaluation    History of Present Illness  Mechanism of injury: Pt present at  with wife Luis. Poor/fair historian due to memory difficulties since storke. Lives in 2 story house but everything is accessible on first level. 3 ALE from garage. Driveway is stone.     Retired .     Pt presents to PT to resume/pick off where he left off of from his last appointment in October 2024. Pt was last seen in October and at his last visit, pt and pt's wife was instructed to take pt to hospital due to having symptoms. Pt ended up being hospitalized then. Pt has catheter long term.     Difficulties/limitations: weakness/fatigue, prolonged walking, transfers, household chores/ADLs; is able to dress himself mostly; RLE is a lot more stiff    Pt notes he has had trouble difficulty with swallowing (did have speech therapy in the past but hasn't had any recently). Instructed pt and pt's wife to notify doctor of this.     Denies any pain. Just mainly weakness in his BLE's. However, will have occasional achiness in his B/L hips.     Copied from PT on 9/17/24:  Mechanism of injury: Pt is a 85 y/o male who presents to PT with his wife with prescription for gait instability and weakness. Baldomero and his wife are uncertain of most details, however said they were generally confident over the details provided.   They stated that he had a stroke Dec 2023, and went to [facility] inpatient rehab for about 2.5 months at. Where he was using walker at end of that time. They noted that he was walking about from living room to ward way thru kitchen/dining room then back to living room. Unfortunately soon after that he fell and broke R hip  March/April. He was in [facility] where they stated a R LEANA was performed. They were uncertain if he went to inpatient rehab, but stated that he was hospitalized about a month. Sounded like from what they were saying that he went to a skilled nursing center for about 1-2 months after that. Stated that after that he had home care PT 3x a week for about a month and a half and completed about 2 weeks ago.   Went to PCP on 9/4/24 where he was prescribed PT.   (Occasionally goes up stairs for exercise, however has bathroom and bedroom on first level where he mostly stays)  Medical history: Stroke Dec 2023, history of TKA's b/l, history of L hip fracture prior to stroke, R LEANA after stroke due to fall and fractured hip     Had rails installed in bathtub so he can  bath tub. Has 2 handrails for 3 stairs to enter home.     Patient Goals  Patient goals for therapy: increased strength, improved balance, increased motion, independence with ADLs/IADLs and decreased pain    UPDATE 6/18/25:   He feels he's improving some. His hips feel a bit stronger and stairs are a bit easier to navigate. Walking is still difficult and tiresome. He still needs help dressing. He would like to be able to walk outside but has gravel driveway and isn't good on that. He's had several falls w/in recent weeks.    Pain  Quality: dull ache   0-6/10 B hips        Objective     General Comments:      Lumbar Comments  4/5 B hip flexion, knee flexion and extension,   4+/5 B ankle DF      B/L hip flexor/knee contractions noted.     Forward head posture/rounded shoulder posture; excess thoracic and lumbar flexion in sitting.    Has difficulty with transfers. Short step/stride length with mild shuffling.     5xSTS : 26 sec w/ B UE use  2MWT: 65 ft w/ FWW and CG/gait belt    POC Expires Reeval for Medicare to be completed Unit Limit Auth Expiration Date PT/OT/STVisit Limit   9/13/25  By visit N/A 4 units 12/31/25 N/A                "      Precautions: h/o stroke, h/o multiple falls, fall risk, generalized weakness, poor memory, hard of hearing, h/o B/L hip replacement/surgeries    Date 6/5 6/9 6/11 6/16 6/18 RE    Auth status:  Approved 7 8 9 10 1   Visits remaining until re-eval (re-eval every 10 visits) 3 2 1 0 9                   Manuals        Gentle hamstring/gastra/hip flexor stretching                                Neuro Re-Ed        Sidestepping at bar with upright posture        Alt step (4\") taps with at least 1 HHA at bar                WBOS vs NBOS with EO         Modified tandem stance                Shld flexion wall slides to promote upright posture                Ther Ex        Edu on progress and POC     Edu on progress and POC   Bike (if able)   6' lvl 1 warmup   3 minutes level 1   Seated marches 2x10  2x10 with PT holding hand above thighs to encourage higher marches 2x10 2x15    Seated heel/toe raises progressing to standing when able Seated 2x10  Seated 3x10 Seated 3x10 Seated 2x10    Standing x10 (B UE at walker)    LAQ w/ VC's for full range ext and quad set at end range  3x10       seated add squeeze  Seated 3x10 5\" Seated 3x10 5\"     Seated hip abd with TB Seated blue TB 2x15 Seated blue TB 2x15 Seated blue TB 2x15     Supine bridge        HS stretch 3x10 sec 5x20\" with foot propped on 8\" step with maual overpressure 3x30\" with foot propped on 8\" step with SOS 3x30 sec (foot on 4\" step; cued for hip hinge rather than lumbar flexion)    Slouch overcorrect 3x5                        Ther Activity        STS with cushions  Unable even w/ 2 foam today 2x8, 1 foam w/ VC for hand placement on armrests  2x8, 1 airex  No UE 2x10, 2 airex  No UE x10, 2 airex  No UE    X5 B UE w/o airex   Mini squats (with bar and chair behind pt) 2x10 (at walker) nv 2x10 at walker 2x10 at walker x10 at walker   Stepups (start with 4\" step)        Car transfer At end of session into car w/ min-mod A @ end of session into car w/ min-mod A   @ " "end of session into car w/ min-mod A  @ end of session into car w/ min-mod A    HEP instruction        steps    4\" x5 each B UE    8\" step down w/ railing and HHA (w/ gait belt and CG-min A) - outside 8\" step down x2 w/ railing and HHA (w/ gait belt and CG-min A) - outside           Gait Training        T/o clinic and also to car (parked outside sidewalk at bottom of ramp) 3x60 ft w/ FWW w/ CG and gait belt    To car W/ FWW w/ CG and gait belt, followed by car transfer w/ min/mod A To car w/ FWW with CG and gait belt f/b car transfer with min/mod A  3x60 ft w/ FWW w/ CG and gait belt 100ft and 45 ft  FWW w/ CG and gait belt    f/b ambulation to car w/ transfer with min/mod A  108 ft and 65 ft  FWW w/ CG and gait belt    f/b ambulation to car w/ transfer with min/mod A            Modalities                                  "

## 2025-06-22 ENCOUNTER — HOSPITAL ENCOUNTER (EMERGENCY)
Dept: HOSPITAL 99 - EMR | Age: 85
Discharge: HOME | End: 2025-06-22
Payer: COMMERCIAL

## 2025-06-22 VITALS — DIASTOLIC BLOOD PRESSURE: 67 MMHG | SYSTOLIC BLOOD PRESSURE: 123 MMHG

## 2025-06-22 VITALS — SYSTOLIC BLOOD PRESSURE: 145 MMHG | DIASTOLIC BLOOD PRESSURE: 81 MMHG

## 2025-06-22 VITALS — DIASTOLIC BLOOD PRESSURE: 83 MMHG | SYSTOLIC BLOOD PRESSURE: 124 MMHG

## 2025-06-22 VITALS — SYSTOLIC BLOOD PRESSURE: 125 MMHG | DIASTOLIC BLOOD PRESSURE: 81 MMHG

## 2025-06-22 DIAGNOSIS — Z87.891: ICD-10-CM
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
## 2025-06-22 DIAGNOSIS — I10: ICD-10-CM

## 2025-06-22 DIAGNOSIS — E78.00: ICD-10-CM

## 2025-06-22 DIAGNOSIS — T83.038A: Primary | ICD-10-CM

## 2025-06-22 DIAGNOSIS — N39.0: ICD-10-CM

## 2025-06-22 DIAGNOSIS — Z79.82: ICD-10-CM

## 2025-06-22 DIAGNOSIS — E03.9: ICD-10-CM

## 2025-06-22 DIAGNOSIS — Z86.73: ICD-10-CM

## 2025-06-22 LAB
ALBUMIN SERPL-MCNC: 4.2 G/DL (ref 3.5–5)
ALP SERPL-CCNC: 70 U/L (ref 38–126)
ALT SERPL-CCNC: 24 U/L (ref 0–50)
AMORPH SED URNS QL MICRO: (no result)
APPEARANCE UR: (no result)
AST SERPL-CCNC: 26 U/L (ref 17–59)
BASOPHILS # BLD AUTO: 0.1 10^3/UL (ref 0–0.2)
BASOPHILS NFR BLD AUTO: 0.6 % (ref 0–2)
BILIRUB SERPL-MCNC: 1.3 MG/DL (ref 0.2–1.3)
BILIRUB UR QL STRIP.AUTO: NEGATIVE
BUN SERPL-MCNC: 13 MG/DL (ref 9–20)
CALCIUM SERPL-MCNC: 9.8 MG/DL (ref 8.4–10.2)
CAOX CRY URNS QL MICRO: (no result)
CHLORIDE SERPL-SCNC: 106 MMOL/L (ref 98–107)
CO2 SERPL-SCNC: 27 MMOL/L (ref 22–30)
COLOR UR: YELLOW
COMMENT: (no result)
CREAT SERPL-MCNC: 0.8 MG/DL (ref 0.7–1.3)
CYSTINE CRY URNS QL MICRO: (no result)
EGFR: > 60
EOSINOPHIL # BLD AUTO: 0.3 10^3/UL (ref 0–0.7)
EOSINOPHIL NFR BLD AUTO: 3.9 % (ref 0–6)
ERYTHROCYTE [DISTWIDTH] IN BLOOD BY AUTOMATED COUNT: 13.5 % (ref 11.5–14.5)
ESTIMATED CREATININE CLEARANCE: (no result) ML/MIN
GLUCOSE SERPL-MCNC: 107 MG/DL (ref 70–99)
GLUCOSE UR QL STRIP.AUTO: NEGATIVE
GRAN CASTS URNS QL MICRO: (no result) /LPF
HCT VFR BLD AUTO: 37.3 % (ref 39–52)
HGB BLD-MCNC: 12.8 G/DL (ref 13–18)
HGB UR QL: (no result)
HYALINE CASTS URNS QL MICRO: (no result) /LPF (ref 0–2)
IMM GRANULOCYTES # BLD AUTO: 0 10^3/UL (ref 0–0.05)
IMM GRANULOCYTES NFR BLD AUTO: 0.2 % (ref 0–0.5)
LEUKOCYTE ESTERASE UR QL STRIP.AUTO: (no result)
LYMPHOCYTES # BLD AUTO: 1.4 10^3/UL (ref 1.2–3.4)
LYMPHOCYTES NFR BLD AUTO: 16.2 % (ref 20.5–51.1)
Lab: (no result) /LPF
Lab: (no result) /LPF
MCH RBC QN AUTO: 30.5 PG (ref 27–31)
MCHC RBC AUTO-ENTMCNC: 34.3 G/DL (ref 33–37)
MCV RBC AUTO: 88.8 FL (ref 80–94)
MONOCYTES # BLD AUTO: 0.6 10^3/UL (ref 0.1–0.6)
MONOCYTES NFR BLD AUTO: 6.6 % (ref 1.7–9.3)
MUCOUS THREADS URNS QL MICRO: (no result)
NEUTROPHILS # BLD AUTO: 6.1 10^3/UL (ref 1.4–6.5)
NEUTROPHILS NFR BLD AUTO: 72.5 % (ref 42.2–75.2)
NITRITE UR QL STRIP.AUTO: POSITIVE
NRBC BLD AUTO-RTO: 0 %
PH UR: 5 [PH] (ref 5–9)
PLATELET # BLD AUTO: 159 10^3/UL (ref 130–400)
PMV BLD AUTO: 10.9 FL (ref 7.4–10.4)
POTASSIUM SERPL-SCNC: 4.1 MMOL/L (ref 3.5–5.1)
PROT SERPL-MCNC: 6.7 G/DL (ref 6.3–8.2)
RBC # BLD AUTO: 4.2 10^6/UL (ref 4.7–6.1)
SODIUM SERPL-SCNC: 140 MMOL/L (ref 135–145)
SP GR UR: 1.02 (ref ?–1.03)
SQUAMOUS URNS QL MICRO: (no result) /LPF
TRANS CELLS UR QL COMP ASSIST: (no result) /LPF
TRI-PHOS CRY URNS QL MICRO: (no result)
URATE CRY URNS QL MICRO: (no result)
URINE ALBUMIN: (no result)
URINE BACTERIA: (no result)
URINE EPITHELIAL CAST: (no result) /LPF
URINE KETONE: NEGATIVE
URINE OTHER: (no result)
URINE RED CELL CAST: (no result) /LPF
URINE SPERM: (no result)
URINE TRICHOMONAS: (no result)
URINE WHITE CELL CAST: (no result) /LPF
URINE WHITE CELL: (no result) /HPF (ref 0–5)
UROBILINOGEN UR QL STRIP.AUTO: NEGATIVE
WBC # BLD AUTO: 8.4 10^3/UL (ref 4.8–10.8)
YEAST #/AREA URNS HPF: (no result) /[HPF]

## 2025-06-22 PROCEDURE — 51798 US URINE CAPACITY MEASURE: CPT

## 2025-06-22 PROCEDURE — 99283 EMERGENCY DEPT VISIT LOW MDM: CPT

## 2025-06-22 RX ADMIN — CEPHALEXIN 500 MG: 500 CAPSULE ORAL at 17:31

## 2025-06-23 ENCOUNTER — OFFICE VISIT (OUTPATIENT)
Dept: PHYSICAL THERAPY | Facility: CLINIC | Age: 85
End: 2025-06-23
Payer: COMMERCIAL

## 2025-06-23 DIAGNOSIS — R53.1 GENERALIZED WEAKNESS: Primary | ICD-10-CM

## 2025-06-23 DIAGNOSIS — R26.81 GAIT INSTABILITY: ICD-10-CM

## 2025-06-23 PROCEDURE — 97530 THERAPEUTIC ACTIVITIES: CPT | Performed by: PHYSICAL THERAPIST

## 2025-06-23 PROCEDURE — 97116 GAIT TRAINING THERAPY: CPT | Performed by: PHYSICAL THERAPIST

## 2025-06-23 PROCEDURE — 97110 THERAPEUTIC EXERCISES: CPT | Performed by: PHYSICAL THERAPIST

## 2025-06-23 NOTE — PROGRESS NOTES
"Daily Note     Today's date: 2025  Patient name: Baldomero Camargo  : 1940  MRN: [id number]  Referring provider: Kolton Trevizo  Dx:   Encounter Diagnosis     ICD-10-CM    1. Generalized weakness  R53.1       2. Gait instability  R26.81                      Subjective: Went to the ER yesterday due to needing new catheter and bag (regular store was closed). Had been leaking/having difficulty w/ urinating. Found out he has an infection and was put on meds. Has another doc appointment on Thursday. His L hip has been a bit sore today.       Objective: See treatment diary below      Assessment: Tolerated treatment well. Patient would benefit from continued PT. Tolerated session well overall though was fatigued post tx. Challenging to perform car transfers, and typically relies on his wife for assistance.       Plan: Continue per plan of care.        POC Expires Reeval for Medicare to be completed Unit Limit Auth Expiration Date PT/OT/STVisit Limit   25  By visit N/A 4 units 25 N/A                     Precautions: h/o stroke, h/o multiple falls, fall risk, generalized weakness, poor memory, hard of hearing, h/o B/L hip replacement/surgeries    Date  RE     Auth status:  Approved 8 9 10 1 2   Visits remaining until re-eval (re-eval every 10 visits) 2 1 0 9 8                   Manuals        Gentle hamstring/gastra/hip flexor stretching                                Neuro Re-Ed        Sidestepping at bar with upright posture        Alt step (4\") taps with at least 1 HHA at bar                WBOS vs NBOS with EO         Modified tandem stance                Shld flexion wall slides to promote upright posture                Ther Ex        Edu on progress and POC    Edu on progress and POC Review entire HEP   Bike (if able)  6' lvl 1 warmup   3 minutes level 1    Seated marches 2x10 with PT holding hand above thighs to encourage higher marches 2x10 2x15  review   Seated heel/toe " "raises progressing to standing when able Seated 3x10 Seated 3x10 Seated 2x10    Standing x10 (B UE at walker)  review   LAQ w/ VC's for full range ext and quad set at end range 3x10     review   seated add squeeze Seated 3x10 5\" Seated 3x10 5\"   Seated 3x10 5 sec   Seated hip abd with TB Seated blue TB 2x15 Seated blue TB 2x15   review   Supine bridge        HS stretch 5x20\" with foot propped on 8\" step with maual overpressure 3x30\" with foot propped on 8\" step with SOS 3x30 sec (foot on 4\" step; cued for hip hinge rather than lumbar flexion)  x30 sec (foot on 4\" step; cued for hip hinge rather than lumbar flexion)   Slouch overcorrect        Scapular elevation, retraction, circles     Review each                   Ther Activity        STS with cushions  2x8, 1 foam w/ VC for hand placement on armrests  2x8, 1 airex  No UE 2x10, 2 airex  No UE x10, 2 airex  No UE    X5 B UE w/o airex x10, x7, x3   2 airex  No UE   Mini squats (with bar and chair behind pt) nv 2x10 at walker 2x10 at walker x10 at walker 2x10 at walker   Stepups (start with 4\" step)        Car transfer @ end of session into car w/ min-mod A   @ end of session into car w/ min-mod A  @ end of session into car w/ min-mod A  @ end of session into car w/ min-mod A    HEP instruction        steps   4\" x5 each B UE    8\" step down w/ railing and HHA (w/ gait belt and CG-min A) - outside 8\" step down x2 w/ railing and HHA (w/ gait belt and CG-min A) - outside 8\" step down x2 w/ railing and HHA (w/ gait belt and CG-min A) - outside           Gait Training        T/o clinic and also to car (parked outside sidewalk at bottom of ramp) To car w/ FWW with CG and gait belt f/b car transfer with min/mod A  3x60 ft w/ FWW w/ CG and gait belt 100ft and 45 ft  FWW w/ CG and gait belt    f/b ambulation to car w/ transfer with min/mod A  108 ft and 65 ft  FWW w/ CG and gait belt    f/b ambulation to car w/ transfer with min/mod A  87 ft, 65 ft and 35 ft  FWW w/ CG and " gait belt    f/b ambulation to car w/ transfer with min/mod A            Modalities

## 2025-06-25 ENCOUNTER — OFFICE VISIT (OUTPATIENT)
Dept: PHYSICAL THERAPY | Facility: CLINIC | Age: 85
End: 2025-06-25
Payer: COMMERCIAL

## 2025-06-25 DIAGNOSIS — R53.1 GENERALIZED WEAKNESS: Primary | ICD-10-CM

## 2025-06-25 DIAGNOSIS — R26.81 GAIT INSTABILITY: ICD-10-CM

## 2025-06-25 PROCEDURE — 97530 THERAPEUTIC ACTIVITIES: CPT | Performed by: PHYSICAL THERAPIST

## 2025-06-25 PROCEDURE — 97110 THERAPEUTIC EXERCISES: CPT | Performed by: PHYSICAL THERAPIST

## 2025-06-25 PROCEDURE — 97116 GAIT TRAINING THERAPY: CPT | Performed by: PHYSICAL THERAPIST

## 2025-06-25 NOTE — PROGRESS NOTES
"Daily Note     Today's date: 2025  Patient name: Baldomero Camargo  : 1940  MRN: [id number]  Referring provider: Kolton Trevizo  Dx:   Encounter Diagnosis     ICD-10-CM    1. Generalized weakness  R53.1       2. Gait instability  R26.81                      Subjective: Saw PCP yesterday. Is supposed to see doc and get a new catheter tomorrow. Has had a little problem getting the medication he needed for his catheter infection - so he thinks he went to PCP who got him sorted out, but he can't remember all the details.       Objective: See treatment diary below      Assessment: Tolerated treatment well. Patient would benefit from continued PT. Pt continues to find ambulation very difficult and slow. However, w/ cueing he responds well to improved hip flexion for stepping. He was fatigued post tx.     Plan: Continue per plan of care.        POC Expires Reeval for Medicare to be completed Unit Limit Auth Expiration Date PT/OT/STVisit Limit   25  By visit N/A 4 units 25 N/A                     Precautions: h/o stroke, h/o multiple falls, fall risk, generalized weakness, poor memory, hard of hearing, h/o B/L hip replacement/surgeries    Date  RE     Auth status:  Approved 9 10 1 2 3   Visits remaining until re-eval (re-eval every 10 visits) 1 0 9 8 7                   Manuals        Gentle hamstring/gastra/hip flexor stretching                                Neuro Re-Ed        Sidestepping at bar with upright posture        Alt step (4\") taps with at least 1 HHA at bar                WBOS vs NBOS with EO         Modified tandem stance                Shld flexion wall slides to promote upright posture                Ther Ex        Edu on progress and POC   Edu on progress and POC Review entire HEP    Bike (if able)    3 minutes level 1     Seated marches 2x10 2x15  review X10    Seated heel/toe raises progressing to standing when able Seated 3x10 Seated 2x10    Standing x10 (B " "UE at walker)  review    LAQ w/ VC's for full range ext and quad set at end range    review    seated add squeeze Seated 3x10 5\"   Seated 3x10 5 sec Seated 3x10 x 5 sec   Seated hip abd with TB Seated blue TB 2x15   review Seated blue TB 2x15   Supine bridge        HS stretch 3x30\" with foot propped on 8\" step with SOS 3x30 sec (foot on 4\" step; cued for hip hinge rather than lumbar flexion)  x30 sec (foot on 4\" step; cued for hip hinge rather than lumbar flexion)    Slouch overcorrect        Scapular elevation, retraction, circles    Review each                    Ther Activity        STS with cushions  2x8, 1 airex  No UE 2x10, 2 airex  No UE x10, 2 airex  No UE    X5 B UE w/o airex x10, x7, x3   2 airex  No UE x10, x5   2 airex  No UE   Mini squats (with bar and chair behind pt) 2x10 at walker 2x10 at walker x10 at walker 2x10 at walker x10 at walker   Stepups (start with 4\" step)        Car transfer  @ end of session into car w/ min-mod A  @ end of session into car w/ min-mod A  @ end of session into car w/ min-mod A  @ end of session into car w/ min-mod A    HEP instruction        steps  4\" x5 each B UE    8\" step down w/ railing and HHA (w/ gait belt and CG-min A) - outside 8\" step down x2 w/ railing and HHA (w/ gait belt and CG-min A) - outside 8\" step down x2 w/ railing and HHA (w/ gait belt and CG-min A) - outside 8\" step down x2 w/ 2 hands on 1 railing  (w/ gait belt and CG-min A) - outside           Gait Training        T/o clinic and also to car (parked outside sidewalk at bottom of ramp) 3x60 ft w/ FWW w/ CG and gait belt 100ft and 45 ft  FWW w/ CG and gait belt    f/b ambulation to car w/ transfer with min/mod A  108 ft and 65 ft  FWW w/ CG and gait belt    f/b ambulation to car w/ transfer with min/mod A  87 ft, 65 ft and 35 ft  FWW w/ CG and gait belt    f/b ambulation to car w/ transfer with min/mod A  85 ft (7 mins 50 sec), 65 ft and 35 ft  FWW w/ CG and gait belt    f/b ambulation to car w/ " transfer with min/mod A            Modalities

## 2025-06-30 ENCOUNTER — OFFICE VISIT (OUTPATIENT)
Dept: PHYSICAL THERAPY | Facility: CLINIC | Age: 85
End: 2025-06-30
Payer: COMMERCIAL

## 2025-06-30 DIAGNOSIS — R26.81 GAIT INSTABILITY: ICD-10-CM

## 2025-06-30 DIAGNOSIS — R53.1 GENERALIZED WEAKNESS: Primary | ICD-10-CM

## 2025-06-30 PROCEDURE — 97530 THERAPEUTIC ACTIVITIES: CPT | Performed by: PHYSICAL THERAPIST

## 2025-06-30 PROCEDURE — 97110 THERAPEUTIC EXERCISES: CPT | Performed by: PHYSICAL THERAPIST

## 2025-06-30 PROCEDURE — 97116 GAIT TRAINING THERAPY: CPT | Performed by: PHYSICAL THERAPIST

## 2025-06-30 NOTE — PROGRESS NOTES
"Daily Note     Today's date: 2025  Patient name: Baldomero Camargo  : 1940  MRN: [id number]  Referring provider: Kolton Trevizo  Dx:   Encounter Diagnosis     ICD-10-CM    1. Generalized weakness  R53.1       2. Gait instability  R26.81             Subjective:  Lhip has been hurting walking into the clinic today - and sometimes at night it wakes him in pain multiple times/night. He struggles to get from the bed to the bathroom at night.       Objective: See treatment diary below      Assessment: Tolerated treatment well. Patient would benefit from continued PT. Pt tolerated session well overall though continues to be very limited in ambulation and endurance. He continues to be at high risk for falls.      Plan: Continue per plan of care.        POC Expires Reeval for Medicare to be completed Unit Limit Auth Expiration Date PT/OT/STVisit Limit   25  By visit N/A 4 units 25 N/A                     Precautions: h/o stroke, h/o multiple falls, fall risk, generalized weakness, poor memory, hard of hearing, h/o B/L hip replacement/surgeries    Date  RE     Auth status:  Approved 10 1 2 3 4   Visits remaining until re-eval (re-eval every 10 visits) 0 9 8 7 6                   Manuals        Gentle hamstring/gastra/hip flexor stretching                                Neuro Re-Ed        Sidestepping at bar with upright posture        Alt step (4\") taps with at least 1 HHA at bar                WBOS vs NBOS with EO         Modified tandem stance                Shld flexion wall slides to promote upright posture                Ther Ex        Edu on progress and POC  Edu on progress and POC Review entire HEP     Bike (if able)   3 minutes level 1      Seated marches 2x15  review X10  2x10   Seated heel/toe raises progressing to standing when able Seated 2x10    Standing x10 (B UE at walker)  review     LAQ w/ VC's for full range ext and quad set at end range   review     seated add " "squeeze   Seated 3x10 5 sec Seated 3x10 x 5 sec    Seated hip abd with TB   review Seated blue TB 2x15 Seated blue TB 2x15   Supine bridge        HS stretch 3x30 sec (foot on 4\" step; cued for hip hinge rather than lumbar flexion)  x30 sec (foot on 4\" step; cued for hip hinge rather than lumbar flexion)     Slouch overcorrect        Scapular elevation, retraction, circles   Review each                     Ther Activity        STS with cushions  2x10, 2 airex  No UE x10, 2 airex  No UE    X5 B UE w/o airex x10, x7, x3   2 airex  No UE x10, x5   2 airex  No UE 2x10  2 airex  No UE   Mini squats (with bar and chair behind pt) 2x10 at walker x10 at walker 2x10 at walker x10 at walker x10 at walker   Stepups (start with 4\" step)        Car transfer @ end of session into car w/ min-mod A  @ end of session into car w/ min-mod A  @ end of session into car w/ min-mod A  @ end of session into car w/ min-mod A  @ end of session into car w/ min-mod A    HEP instruction        steps 4\" x5 each B UE    8\" step down w/ railing and HHA (w/ gait belt and CG-min A) - outside 8\" step down x2 w/ railing and HHA (w/ gait belt and CG-min A) - outside 8\" step down x2 w/ railing and HHA (w/ gait belt and CG-min A) - outside 8\" step down x2 w/ 2 hands on 1 railing  (w/ gait belt and CG-min A) - outside 6\" x10 each B/L UE on FWW (w/ gait belt and CG-min A) - outside    8\" step down x w/ railing and HHA (w/ gait belt and CG-min A) - outside    Curb step down outside, w/ 1 hand on FWW and 1 HHA (w/ gait belt and CG-min A) - outside           Gait Training        T/o clinic and also to car (parked outside sidewalk at bottom of ramp) 100ft and 45 ft  FWW w/ CG and gait belt    f/b ambulation to car w/ transfer with min/mod A  108 ft and 65 ft  FWW w/ CG and gait belt    f/b ambulation to car w/ transfer with min/mod A  87 ft, 65 ft and 35 ft  FWW w/ CG and gait belt    f/b ambulation to car w/ transfer with min/mod A  85 ft (7 mins 50 sec), 65 " ft and 35 ft  FWW w/ CG and gait belt    f/b ambulation to car w/ transfer with min/mod A  54 ft (5 mins), and 85 ft  FWW w/ CG and gait belt    f/b ambulation to car w/ transfer with min/mod A            Modalities

## 2025-07-03 ENCOUNTER — OFFICE VISIT (OUTPATIENT)
Dept: PHYSICAL THERAPY | Facility: CLINIC | Age: 85
End: 2025-07-03
Payer: COMMERCIAL

## 2025-07-03 DIAGNOSIS — R26.81 GAIT INSTABILITY: ICD-10-CM

## 2025-07-03 DIAGNOSIS — R53.1 GENERALIZED WEAKNESS: Primary | ICD-10-CM

## 2025-07-03 PROCEDURE — 97110 THERAPEUTIC EXERCISES: CPT | Performed by: PHYSICAL THERAPIST

## 2025-07-03 PROCEDURE — 97116 GAIT TRAINING THERAPY: CPT | Performed by: PHYSICAL THERAPIST

## 2025-07-03 PROCEDURE — 97530 THERAPEUTIC ACTIVITIES: CPT | Performed by: PHYSICAL THERAPIST

## 2025-07-03 NOTE — PROGRESS NOTES
"Daily Note     Today's date: 7/3/2025  Patient name: Baldomero Camargo  : 1940  MRN: [id number]  Referring provider: Kolton Trevizo  Dx:   Encounter Diagnosis     ICD-10-CM    1. Generalized weakness  R53.1       2. Gait instability  R26.81                      Subjective: Was tough sitting in the hot car the other day and had difficulty getting in from the garage, but he managed. Still has pain in L hip.       Objective: See treatment diary below      Assessment: Tolerated treatment well. Patient would benefit from continued PT. Pt demonstrated improved speed w/ ambulation today, though needed frequent cues to avoid anterior WS and maintain \"hips over heels\" w/o trunk extension in standing/walking.       Plan: Continue per plan of care.        POC Expires Reeval for Medicare to be completed Unit Limit Auth Expiration Date PT/OT/STVisit Limit   25  By visit N/A 4 units 25 N/A                     Precautions: h/o stroke, h/o multiple falls, fall risk, generalized weakness, poor memory, hard of hearing, h/o B/L hip replacement/surgeries    Date  RE  6/23 6/25 6/30 7/3   Auth status:  Approved 1 2 3 4 5   Visits remaining until re-eval (re-eval every 10 visits) 9 8 7 6 5                   Manuals        Gentle hamstring/gastra/hip flexor stretching                                Neuro Re-Ed        Sidestepping at bar with upright posture        Alt step (4\") taps with at least 1 HHA at bar                WBOS vs NBOS with EO         Modified tandem stance                Shld flexion wall slides to promote upright posture                Ther Ex        Edu on progress and POC Edu on progress and POC Review entire HEP   Edu on posterior WS in standing to hips over heels   Bike (if able)  3 minutes level 1    6 minutes level 0   Seated marches  review X10  2x10    Seated heel/toe raises progressing to standing when able  review      LAQ w/ VC's for full range ext and quad set at end range  review    " "  seated add squeeze  Seated 3x10 5 sec Seated 3x10 x 5 sec     Seated hip abd with TB  review Seated blue TB 2x15 Seated blue TB 2x15 Seated blue TB 2x15   Supine bridge        HS stretch  x30 sec (foot on 4\" step; cued for hip hinge rather than lumbar flexion)      Slouch overcorrect        Scapular elevation, retraction, circles  Review each                      Ther Activity        STS with cushions  x10, 2 airex  No UE    X5 B UE w/o airex x10, x7, x3   2 airex  No UE x10, x5   2 airex  No UE 2x10  2 airex  No UE x10  2 airex  No UE   Mini squats (with bar and chair behind pt) x10 at walker 2x10 at walker x10 at walker x10 at walker    Stepups (start with 4\" step)        Car transfer @ end of session into car w/ min-mod A  @ end of session into car w/ min-mod A  @ end of session into car w/ min-mod A  @ end of session into car w/ min-mod A  @ end of session into car w/ min-mod A    HEP instruction        steps 8\" step down x2 w/ railing and HHA (w/ gait belt and CG-min A) - outside 8\" step down x2 w/ railing and HHA (w/ gait belt and CG-min A) - outside 8\" step down x2 w/ 2 hands on 1 railing  (w/ gait belt and CG-min A) - outside 6\" x10 each B/L UE on FWW (w/ gait belt and CG-min A) - outside    8\" step down x w/ railing and HHA (w/ gait belt and CG-min A) - outside    Curb step down outside, w/ 1 hand on FWW and 1 HHA (w/ gait belt and CG-min A) - outside 8\" step down x w/ railing and HHA (w/ gait belt and CG-min A) - outside   Lateral steps     At half wall 2x6 ft   Gait Training        T/o clinic and also to car (parked outside sidewalk at bottom of ramp) 108 ft and 65 ft  FWW w/ CG and gait belt    f/b ambulation to car w/ transfer with min/mod A  87 ft, 65 ft and 35 ft  FWW w/ CG and gait belt    f/b ambulation to car w/ transfer with min/mod A  85 ft (7 mins 50 sec), 65 ft and 35 ft  FWW w/ CG and gait belt    f/b ambulation to car w/ transfer with min/mod A  54 ft (5 mins), and 85 ft  FWW w/ CG and gait " belt    f/b ambulation to car w/ transfer with min/mod A  2x40 ft  FWW w/ CG and gait belt    f/b ambulation to car w/ transfer with min/mod A            Modalities

## 2025-07-24 ENCOUNTER — OFFICE VISIT (OUTPATIENT)
Dept: PHYSICAL THERAPY | Facility: CLINIC | Age: 85
End: 2025-07-24
Payer: COMMERCIAL

## 2025-07-24 DIAGNOSIS — R26.81 GAIT INSTABILITY: ICD-10-CM

## 2025-07-24 DIAGNOSIS — R53.1 GENERALIZED WEAKNESS: Primary | ICD-10-CM

## 2025-07-24 PROCEDURE — 97530 THERAPEUTIC ACTIVITIES: CPT | Performed by: PHYSICAL THERAPIST

## 2025-07-24 PROCEDURE — 97110 THERAPEUTIC EXERCISES: CPT | Performed by: PHYSICAL THERAPIST

## 2025-07-24 PROCEDURE — 97116 GAIT TRAINING THERAPY: CPT | Performed by: PHYSICAL THERAPIST

## 2025-07-24 NOTE — PROGRESS NOTES
"Daily Note     Today's date: 2025  Patient name: Baldomero Camargo  : 1940  MRN: [id number]  Referring provider: Kolton Trevizo  Dx:   Encounter Diagnosis     ICD-10-CM    1. Generalized weakness  R53.1       2. Gait instability  R26.81                      Subjective: He's been doing pretty ok overall. Still having some pain in his hip at times. Getting dressed is difficult bc of long toenails and he can't cut them. Feels a bit \"off\" today and feels slow and tired. Felt like he might pass out yesterday when he got home after having been out to get his catheter changed; he was exhausted      Objective: See treatment diary below      Assessment: Tolerated treatment well. Patient would benefit from continued PT. Pt tolerated session though was very fatigued. Pt continues to exhibit very slow pace of ambulation, w/ difficulty performing hip flexion for foot clearance.       Plan: Continue per plan of care.        POC Expires Reeval for Medicare to be completed Unit Limit Auth Expiration Date PT/OT/STVisit Limit   25  By visit N/A 4 units 25 N/A                     Precautions: h/o stroke, h/o multiple falls, fall risk, generalized weakness, poor memory, hard of hearing, h/o B/L hip replacement/surgeries    Date 6/25 6/30 7/3 7/24   Auth status:  Approved 3 4 5 6   Visits remaining until re-eval (re-eval every 10 visits) 7 6 5 4                 Manuals       Gentle hamstring/gastra/hip flexor stretching                            Neuro Re-Ed       Sidestepping at bar with upright posture       Alt step (4\") taps with at least 1 HHA at bar              WBOS vs NBOS with EO        Modified tandem stance              Shld flexion wall slides to promote upright posture              Ther Ex       Edu on progress and POC   Edu on posterior WS in standing to hips over heels    Bike (if able)    6 minutes level 0    Seated marches X10  2x10  2x10   Seated heel/toe raises progressing to standing when able   " "    LAQ w/ VC's for full range ext and quad set at end range       seated add squeeze Seated 3x10 x 5 sec      Seated hip abd with TB Seated blue TB 2x15 Seated blue TB 2x15 Seated blue TB 2x15 review   Supine bridge       HS stretch       Slouch overcorrect       Scapular elevation, retraction, circles                     Ther Activity       STS with cushions  x10, x5   2 airex  No UE 2x10  2 airex  No UE x10  2 airex  No UE 2x10  2 airex  No UE   Mini squats (with bar and chair behind pt) x10 at walker x10 at walker     Stepups (start with 4\" step)       Car transfer @ end of session into car w/ min-mod A  @ end of session into car w/ min-mod A  @ end of session into car w/ min-mod A  @ end of session into car w/ min-mod A    HEP instruction       steps 8\" step down x2 w/ 2 hands on 1 railing  (w/ gait belt and CG-min A) - outside 6\" x10 each B/L UE on FWW (w/ gait belt and CG-min A) - outside    8\" step down x w/ railing and HHA (w/ gait belt and CG-min A) - outside    Curb step down outside, w/ 1 hand on FWW and 1 HHA (w/ gait belt and CG-min A) - outside 8\" step down x w/ railing and HHA (w/ gait belt and CG-min A) - outside 8\" step down x w/ railing and HHA (w/ gait belt and CG-min A) - outside   Lateral steps   At half wall 2x6 ft At railing x10 ft L/R w/ gait belt and CG   Gait Training       T/o clinic and also to car (parked outside sidewalk at bottom of ramp) 85 ft (7 mins 50 sec), 65 ft and 35 ft  FWW w/ CG and gait belt    f/b ambulation to car w/ transfer with min/mod A  54 ft (5 mins), and 85 ft  FWW w/ CG and gait belt    f/b ambulation to car w/ transfer with min/mod A  2x40 ft  FWW w/ CG and gait belt    f/b ambulation to car w/ transfer with min/mod A  x50 ft (6 mins 45 sec) and x30 ft  FWW w/ CG and gait belt    f/b ambulation to car w/ transfer with min/mod A           Modalities                                            "

## 2025-07-28 ENCOUNTER — OFFICE VISIT (OUTPATIENT)
Dept: PHYSICAL THERAPY | Facility: CLINIC | Age: 85
End: 2025-07-28
Payer: COMMERCIAL

## 2025-07-28 DIAGNOSIS — R26.81 GAIT INSTABILITY: ICD-10-CM

## 2025-07-28 DIAGNOSIS — R53.1 GENERALIZED WEAKNESS: Primary | ICD-10-CM

## 2025-07-28 PROCEDURE — 97110 THERAPEUTIC EXERCISES: CPT | Performed by: PHYSICAL THERAPIST

## 2025-07-28 PROCEDURE — 97530 THERAPEUTIC ACTIVITIES: CPT | Performed by: PHYSICAL THERAPIST

## 2025-07-28 PROCEDURE — 97116 GAIT TRAINING THERAPY: CPT | Performed by: PHYSICAL THERAPIST

## 2025-08-01 ENCOUNTER — OFFICE VISIT (OUTPATIENT)
Dept: PHYSICAL THERAPY | Facility: CLINIC | Age: 85
End: 2025-08-01
Payer: COMMERCIAL

## 2025-08-01 DIAGNOSIS — R53.1 GENERALIZED WEAKNESS: Primary | ICD-10-CM

## 2025-08-01 DIAGNOSIS — R26.81 GAIT INSTABILITY: ICD-10-CM

## 2025-08-01 PROCEDURE — 97116 GAIT TRAINING THERAPY: CPT | Performed by: PHYSICAL THERAPIST

## 2025-08-01 PROCEDURE — 97530 THERAPEUTIC ACTIVITIES: CPT | Performed by: PHYSICAL THERAPIST

## 2025-08-01 PROCEDURE — 97110 THERAPEUTIC EXERCISES: CPT | Performed by: PHYSICAL THERAPIST
